# Patient Record
Sex: MALE | HISPANIC OR LATINO | Employment: OTHER | URBAN - METROPOLITAN AREA
[De-identification: names, ages, dates, MRNs, and addresses within clinical notes are randomized per-mention and may not be internally consistent; named-entity substitution may affect disease eponyms.]

---

## 2020-02-11 ENCOUNTER — OFFICE VISIT - HEALTHEAST (OUTPATIENT)
Dept: CARDIOLOGY | Facility: CLINIC | Age: 60
End: 2020-02-11

## 2020-02-11 DIAGNOSIS — I10 BENIGN ESSENTIAL HYPERTENSION: ICD-10-CM

## 2020-02-11 LAB
ALBUMIN SERPL-MCNC: 4.2 G/DL (ref 3.5–5)
ALBUMIN UR-MCNC: ABNORMAL MG/DL
ALP SERPL-CCNC: 61 U/L (ref 45–120)
ALT SERPL W P-5'-P-CCNC: 41 U/L (ref 0–45)
ANION GAP SERPL CALCULATED.3IONS-SCNC: 8 MMOL/L (ref 5–18)
APPEARANCE UR: CLEAR
AST SERPL W P-5'-P-CCNC: 24 U/L (ref 0–40)
BACTERIA #/AREA URNS HPF: ABNORMAL HPF
BILIRUB SERPL-MCNC: 0.4 MG/DL (ref 0–1)
BILIRUB UR QL STRIP: NEGATIVE
BUN SERPL-MCNC: 24 MG/DL (ref 8–22)
CALCIUM SERPL-MCNC: 9.5 MG/DL (ref 8.5–10.5)
CHLORIDE BLD-SCNC: 106 MMOL/L (ref 98–107)
CO2 SERPL-SCNC: 26 MMOL/L (ref 22–31)
COLOR UR AUTO: YELLOW
CREAT SERPL-MCNC: 0.9 MG/DL (ref 0.7–1.3)
ERYTHROCYTE [DISTWIDTH] IN BLOOD BY AUTOMATED COUNT: 12.7 % (ref 11–14.5)
GFR SERPL CREATININE-BSD FRML MDRD: >60 ML/MIN/1.73M2
GLUCOSE BLD-MCNC: 78 MG/DL (ref 70–125)
GLUCOSE UR STRIP-MCNC: ABNORMAL MG/DL
HCT VFR BLD AUTO: 44.3 % (ref 40–54)
HGB BLD-MCNC: 14.9 G/DL (ref 14–18)
HGB UR QL STRIP: NEGATIVE
KETONES UR STRIP-MCNC: NEGATIVE MG/DL
LEUKOCYTE ESTERASE UR QL STRIP: NEGATIVE
MCH RBC QN AUTO: 30.6 PG (ref 27–34)
MCHC RBC AUTO-ENTMCNC: 33.6 G/DL (ref 32–36)
MCV RBC AUTO: 91 FL (ref 80–100)
MUCOUS THREADS #/AREA URNS LPF: ABNORMAL LPF
NITRATE UR QL: NEGATIVE
PH UR STRIP: 5.5 [PH] (ref 4.5–8)
PLATELET # BLD AUTO: 181 THOU/UL (ref 140–440)
PMV BLD AUTO: 12.4 FL (ref 8.5–12.5)
POTASSIUM BLD-SCNC: 3.9 MMOL/L (ref 3.5–5)
PROT SERPL-MCNC: 7.2 G/DL (ref 6–8)
RBC # BLD AUTO: 4.87 MILL/UL (ref 4.4–6.2)
RBC #/AREA URNS AUTO: ABNORMAL HPF
SODIUM SERPL-SCNC: 140 MMOL/L (ref 136–145)
SP GR UR STRIP: 1.03 (ref 1–1.03)
SQUAMOUS #/AREA URNS AUTO: ABNORMAL LPF
UROBILINOGEN UR STRIP-ACNC: ABNORMAL
WBC #/AREA URNS AUTO: ABNORMAL HPF
WBC: 7.6 THOU/UL (ref 4–11)

## 2020-02-12 ENCOUNTER — AMBULATORY - HEALTHEAST (OUTPATIENT)
Dept: CARDIOLOGY | Facility: CLINIC | Age: 60
End: 2020-02-12

## 2020-02-12 DIAGNOSIS — I10 BENIGN ESSENTIAL HYPERTENSION: ICD-10-CM

## 2020-02-12 LAB
CHOLEST SERPL-MCNC: 123 MG/DL
FASTING STATUS PATIENT QL REPORTED: YES
HDLC SERPL-MCNC: 30 MG/DL
LDLC SERPL CALC-MCNC: 73 MG/DL
TRIGL SERPL-MCNC: 99 MG/DL

## 2020-02-13 LAB
SHBG SERPL-SCNC: 19 NMOL/L (ref 11–80)
TESTOST FREE SERPL-MCNC: 6.19 NG/DL (ref 4.7–24.4)
TESTOST SERPL-MCNC: 242 NG/DL (ref 240–950)

## 2020-07-30 ENCOUNTER — VIRTUAL VISIT (OUTPATIENT)
Dept: FAMILY MEDICINE | Facility: OTHER | Age: 60
End: 2020-07-30

## 2020-07-31 ENCOUNTER — AMBULATORY - HEALTHEAST (OUTPATIENT)
Dept: FAMILY MEDICINE | Facility: CLINIC | Age: 60
End: 2020-07-31

## 2020-07-31 DIAGNOSIS — Z20.822 SUSPECTED COVID-19 VIRUS INFECTION: ICD-10-CM

## 2020-07-31 NOTE — PROGRESS NOTES
"Date: 2020 07:08:38  Clinician: Melissa Roe  Clinician NPI: [id number]  Patient: Pastor Churchill  Patient : 1960  Patient Address: [address] [place], [place] [address]  Patient Phone: [phone]  Visit Protocol: URI  Patient Summary:   is a 59 year old ( : 1960 ) male who initiated a Visit for COVID-19 (Coronavirus) evaluation and screening. When asked the question \"Please sign me up to receive news, health information and promotions. \",  responded \"No\".     states his symptoms started 1-2 days ago.   His symptoms consist of diarrhea, a cough, nasal congestion, rhinitis, malaise, and a headache.   Symptom details     Nasal secretions: The color of his mucus is clear.    Cough:  coughs a few times an hour and his cough is not more bothersome at night. Phlegm does not come into his throat when he coughs. He does not believe his cough is caused by post-nasal drip.     Headache: He states the headache is mild (1-3 on a 10 point pain scale).       denies having wheezing, nausea, teeth pain, ageusia, myalgias, sore throat, anosmia, facial pain or pressure, fever, vomiting, ear pain, and chills. He also denies having recent facial or sinus surgery in the past 60 days and taking antibiotic medication in the past month. He is not experiencing dyspnea.   Precipitating events  He has not recently been exposed to someone with influenza.  has been in close contact with the following high risk individuals: adults 65 or older and people with asthma, heart disease or diabetes.   Pertinent COVID-19 (Coronavirus) information  In the past 14 days,  has not worked in a congregate living setting.   He does not work or volunteer as healthcare worker or a  and does not work or volunteer in a healthcare facility.    also has not lived in a congregate living setting in the past 14 days. He does not live with a healthcare worker.    has " not had a close contact with a laboratory-confirmed COVID-19 patient within 14 days of symptom onset.   Pertinent medical history   does not need a return to work/school note.   Weight: 220 lbs    does not smoke or use smokeless tobacco.   Weight: 220 lbs    MEDICATIONS: atorvastatin oral, ALLERGIES: enalapril  Clinician Response:  Dear ,   Your symptoms show that you may have coronavirus (COVID-19). This illness can cause fever, cough and trouble breathing. Many people get a mild case and get better on their own. Some people can get very sick.  What should I do?  We would like to test you for this virus.   1. Please call [phone] to schedule your visit. Explain that you were referred by [facility] to have a COVID-19 test. Be ready to share [facility].  The following will serve as your written order for this COVID Test, ordered by me, for the indication of suspected COVID [Z20.828]: The test will be ordered in [facility], our electronic health record, after you are scheduled. It will show as ordered and authorized by Osmin Miranda MD.  Order: COVID-19 (Coronavirus) PCR for SYMPTOMATIC testing from [facility].      2. When it's time for your COVID test:  Stay at least 6 feet away from others. (If someone will drive you to your test, stay in the backseat, as far away from the  as you can.)   Cover your mouth and nose with a mask, tissue or washcloth.  Go straight to the testing site. Don't make any stops on the way there or back.      3.Starting now: Stay home and away from others (self-isolate) until:   You've had no fever---and no medicine that reduces fever---for 3 full days (72 hours). And...   Your other symptoms have gotten better. For example, your cough or breathing has improved. And...   At least 10 days have passed since your symptoms started.       During this time, don't leave the house except for testing or medical care.   Stay in your own room, even for meals. Use your own  "bathroom if you can.   Stay away from others in your home. No hugging, kissing or shaking hands. No visitors.  Don't go to work, school or anywhere else.    Clean \"high touch\" surfaces often (doorknobs, counters, handles, etc.). Use a household cleaning spray or wipes. You'll find a full list of  on the EPA website: www.epa.gov/pesticide-registration/list-n-disinfectants-use-against-sars-cov-2.   Cover your mouth and nose with a mask, tissue or washcloth to avoid spreading germs.  Wash your hands and face often. Use soap and water.  Caregivers in these groups are at risk for severe illness due to COVID-19:  o People 65 years and older  o People who live in a nursing home or long-term care facility  o People with chronic disease (lung, heart, cancer, diabetes, kidney, liver, immunologic)  o People who have a weakened immune system, including those who:   Are in cancer treatment  Take medicine that weakens the immune system, such as corticosteroids  Had a bone marrow or organ transplant  Have an immune deficiency  Have poorly controlled HIV or AIDS  Are obese (body mass index of 40 or higher)  Smoke regularly   o Caregivers should wear gloves while washing dishes, handling laundry and cleaning bedrooms and bathrooms.  o Use caution when washing and drying laundry: Don't shake dirty laundry, and use the warmest water setting that you can.  o For more tips, go to www.cdc.gov/coronavirus/2019-ncov/downloads/10Things.pdf.    4.Sign up for [employer]. We know it's scary to hear that you might have COVID-19. We want to track your symptoms to make sure you're okay over the next 2 weeks. Please look for an email from [employer]---this is a free, online program that we'll use to keep in touch. To sign up, follow the link in the email. Learn more at [email or web address]  How can I take care of myself?   Get lots of rest. Drink extra fluids (unless a doctor has told you not to).   Take Tylenol " (acetaminophen) for fever or pain. If you have liver or kidney problems, ask your family doctor if it's okay to take Tylenol.   Adults can take either:    650 mg (two 325 mg pills) every 4 to 6 hours, or...   1,000 mg (two 500 mg pills) every 8 hours as needed.    Note: Don't take more than 3,000 mg in one day. Acetaminophen is found in many medicines (both prescribed and over-the-counter medicines). Read all labels to be sure you don't take too much.   For children, check the Tylenol bottle for the right dose. The dose is based on the child's age or weight.    If you have other health problems (like cancer, heart failure, an organ transplant or severe kidney disease): Call your specialty clinic if you don't feel better in the next 2 days.       Know when to call 911. Emergency warning signs include:    Trouble breathing or shortness of breath Pain or pressure in the chest that doesn't go away Feeling confused like you haven't felt before, or not being able to wake up Bluish-colored lips or face.  Where can I get more information?   [facility] -- About COVID-19: [email or web address]   CDC -- What to Do If You're Sick: www.cdc.gov/coronavirus/2019-ncov/about/steps-when-sick.html   CDC -- Ending Home Isolation: www.cdc.gov/coronavirus/2019-ncov/hcp/disposition-in-home-patients.html   CDC -- Caring for Someone: www.cdc.gov/coronavirus/2019-ncov/if-you-are-sick/care-for-someone.html   [facility] -- Interim Guidance for Hospital Discharge to Home: www.health.[facility].[place]/diseases/coronavirus/hcp/hospdischarge.pdf   [facility] clinical trials (COVID-19 research studies): clinicalaffairs.[facility]/umn-clinical-trials    Below are the COVID-19 hotlines at the [place] Department of Health ([facility]). Interpreters are available.    For health questions: Call 737-058-2562 or 1-858.447.3607 (7 a.m. to 7 p.m.) For questions about schools and childcare: Call 426-187-3692 or 1-706.485.7897 (7 a.m. to 7 p.m.)     Diagnosis: Cough  Diagnosis ICD: R05

## 2020-08-02 ENCOUNTER — COMMUNICATION - HEALTHEAST (OUTPATIENT)
Dept: SCHEDULING | Facility: CLINIC | Age: 60
End: 2020-08-02

## 2020-08-03 ENCOUNTER — COMMUNICATION - HEALTHEAST (OUTPATIENT)
Dept: SCHEDULING | Facility: CLINIC | Age: 60
End: 2020-08-03

## 2020-08-03 ENCOUNTER — NURSE TRIAGE (OUTPATIENT)
Dept: NURSING | Facility: CLINIC | Age: 60
End: 2020-08-03

## 2020-08-18 ENCOUNTER — COMMUNICATION - HEALTHEAST (OUTPATIENT)
Dept: CARDIOLOGY | Facility: CLINIC | Age: 60
End: 2020-08-18

## 2020-08-18 DIAGNOSIS — I10 BENIGN ESSENTIAL HYPERTENSION: ICD-10-CM

## 2020-08-25 ENCOUNTER — AMBULATORY - HEALTHEAST (OUTPATIENT)
Dept: LAB | Facility: CLINIC | Age: 60
End: 2020-08-25

## 2020-08-25 DIAGNOSIS — I10 BENIGN ESSENTIAL HYPERTENSION: ICD-10-CM

## 2020-08-25 LAB
ANION GAP SERPL CALCULATED.3IONS-SCNC: 9 MMOL/L (ref 5–18)
BUN SERPL-MCNC: 21 MG/DL (ref 8–22)
CALCIUM SERPL-MCNC: 9.3 MG/DL (ref 8.5–10.5)
CHLORIDE BLD-SCNC: 105 MMOL/L (ref 98–107)
CO2 SERPL-SCNC: 21 MMOL/L (ref 22–31)
CREAT SERPL-MCNC: 0.93 MG/DL (ref 0.7–1.3)
GFR SERPL CREATININE-BSD FRML MDRD: >60 ML/MIN/1.73M2
GLUCOSE BLD-MCNC: 90 MG/DL (ref 70–125)
POTASSIUM BLD-SCNC: 4.3 MMOL/L (ref 3.5–5)
SODIUM SERPL-SCNC: 135 MMOL/L (ref 136–145)

## 2020-10-16 ENCOUNTER — COMMUNICATION - HEALTHEAST (OUTPATIENT)
Dept: CARDIOLOGY | Facility: CLINIC | Age: 60
End: 2020-10-16

## 2020-10-16 ENCOUNTER — COMMUNICATION - HEALTHEAST (OUTPATIENT)
Dept: SCHEDULING | Facility: CLINIC | Age: 60
End: 2020-10-16

## 2020-10-16 DIAGNOSIS — I10 BENIGN ESSENTIAL HYPERTENSION: ICD-10-CM

## 2020-11-18 ENCOUNTER — AMBULATORY - HEALTHEAST (OUTPATIENT)
Dept: LAB | Facility: CLINIC | Age: 60
End: 2020-11-18

## 2020-11-18 ENCOUNTER — OFFICE VISIT - HEALTHEAST (OUTPATIENT)
Dept: FAMILY MEDICINE | Facility: CLINIC | Age: 60
End: 2020-11-18

## 2020-11-18 DIAGNOSIS — Z20.822 CLOSE EXPOSURE TO 2019 NOVEL CORONAVIRUS: ICD-10-CM

## 2020-11-20 ENCOUNTER — COMMUNICATION - HEALTHEAST (OUTPATIENT)
Dept: SCHEDULING | Facility: CLINIC | Age: 60
End: 2020-11-20

## 2020-11-22 ENCOUNTER — HEALTH MAINTENANCE LETTER (OUTPATIENT)
Age: 60
End: 2020-11-22

## 2020-11-30 ENCOUNTER — COMMUNICATION - HEALTHEAST (OUTPATIENT)
Dept: CARDIOLOGY | Facility: CLINIC | Age: 60
End: 2020-11-30

## 2020-11-30 DIAGNOSIS — I10 BENIGN ESSENTIAL HYPERTENSION: ICD-10-CM

## 2020-12-23 ENCOUNTER — COMMUNICATION - HEALTHEAST (OUTPATIENT)
Dept: CARDIOLOGY | Facility: CLINIC | Age: 60
End: 2020-12-23

## 2020-12-23 DIAGNOSIS — E78.5 HYPERLIPIDEMIA: ICD-10-CM

## 2020-12-23 DIAGNOSIS — I10 BENIGN ESSENTIAL HYPERTENSION: ICD-10-CM

## 2021-02-15 ENCOUNTER — COMMUNICATION - HEALTHEAST (OUTPATIENT)
Dept: CARDIOLOGY | Facility: CLINIC | Age: 61
End: 2021-02-15

## 2021-02-18 ENCOUNTER — OFFICE VISIT - HEALTHEAST (OUTPATIENT)
Dept: CARDIOLOGY | Facility: CLINIC | Age: 61
End: 2021-02-18

## 2021-02-18 DIAGNOSIS — I10 BENIGN ESSENTIAL HYPERTENSION: ICD-10-CM

## 2021-02-18 RX ORDER — AMLODIPINE BESYLATE 5 MG/1
5 TABLET ORAL DAILY
Qty: 90 TABLET | Refills: 3 | Status: SHIPPED | OUTPATIENT
Start: 2021-02-18 | End: 2021-08-04

## 2021-02-18 RX ORDER — FINASTERIDE 5 MG/1
5 TABLET, FILM COATED ORAL DAILY
Qty: 90 TABLET | Refills: 3 | Status: SHIPPED | OUTPATIENT
Start: 2021-02-18 | End: 2022-03-28

## 2021-06-01 ENCOUNTER — COMMUNICATION - HEALTHEAST (OUTPATIENT)
Dept: CARDIOLOGY | Facility: CLINIC | Age: 61
End: 2021-06-01

## 2021-06-01 DIAGNOSIS — E78.5 HYPERLIPIDEMIA: ICD-10-CM

## 2021-06-01 DIAGNOSIS — I10 BENIGN ESSENTIAL HYPERTENSION: ICD-10-CM

## 2021-06-01 RX ORDER — ATORVASTATIN CALCIUM 40 MG/1
40 TABLET, FILM COATED ORAL AT BEDTIME
Qty: 90 TABLET | Refills: 1 | Status: SHIPPED | OUTPATIENT
Start: 2021-06-01 | End: 2022-04-22

## 2021-06-01 RX ORDER — LISINOPRIL 20 MG/1
20 TABLET ORAL DAILY
Qty: 90 TABLET | Refills: 1 | Status: SHIPPED | OUTPATIENT
Start: 2021-06-01 | End: 2022-09-29

## 2021-06-04 VITALS
SYSTOLIC BLOOD PRESSURE: 128 MMHG | RESPIRATION RATE: 16 BRPM | DIASTOLIC BLOOD PRESSURE: 74 MMHG | HEART RATE: 72 BPM | WEIGHT: 247 LBS

## 2021-06-06 NOTE — PATIENT INSTRUCTIONS - HE
"Pastor Churchill,    It was a pleasure to see you today at the [facility].     My recommendations after this visit include:    1.  Please stop your enalapril and start lisinopril 5 mg each day.    2.  In a couple of weeks, please call us with about 10 blood pressure readings.  You can call Macey Davison RN at [phone]    3.  Would recommend 30 minutes of aerobic exercise, at least 5 times a week.    4.  Would recommend reducing carbohydrates in your diet by about 50% and substituting protein.  This along with aerobic exercise would undoubtedly result in some weight loss which would be beneficial as well.    5.  I would recommend you repeat blood work to check your kidney function and electrolytes in about 3 weeks because of the increased dose of your blood pressure medication.    6.  I would recommend you read the book\" the science of a long life\" by Dr. Lupe Martinez    7.  I would recommend a follow-up visit in the fall.        Cain Worthy          "

## 2021-06-10 NOTE — TELEPHONE ENCOUNTER
Coronavirus (COVID-19) Notification    Lab Result   Lab test 2019-nCoV rRt-PCR OR SARS-COV-2 PCR    Nasopharyngeal AND/OR Oropharyngeal swab is NEGATIVE for 2019-nCoV RNA [OR] SARS-COV-2 RNA (COVID-19) RNA    Your result was negative. This means that we didn't find the virus that causes COVID-19 in your sample. A test may show negative when you do actually have the virus. This can happen when the virus is in the early stages of infection, before you feel illness symptoms.    If you have symptoms   Stay home and away from others (self-isolate) until you meet ALL of the guidelines below:    You've had no fever--and no medicine that reduces fever--for 3 full days (72 hours). And      Your other symptoms have gotten better. For example, your cough or breathing has improved. And     At least 10 days have passed since your symptoms started.    During this time:    Stay home. Don't go to work, school or anywhere else.     Stay in your own room, including for meals. Use your own bathroom if you can.    Stay away from others in your home. No hugging, kissing or shaking hands. No visitors.    Clean  high touch  surfaces often (doorknobs, counters, handles, etc.). Use a household cleaning spray or wipes. You can find a full list on the EPA website at www.epa.gov/pesticide-registration/list-n-disinfectants-use-against-sars-cov-2.    Cover your mouth and nose with a mask, tissue or washcloth to avoid spreading germs.    Wash your hands and face often with soap and water.    Going back to work  Check with your employer for any guidelines to follow for going back to work.  You are sent a letter for your Employer which will serve as formal document notice that you, the employee, tested negative for COVID-19, as of the testing date shown above.    If your symptoms worsen or other concerning symptoms, contact PCP, oncare or consider returning to Emergency Dept.    Where can I get more information?    [facility]:  [email or web address]    Coronavirus Basics: www.[facility]./diseases/coronavirus/basics.html    [facility] Hotline ([phone])    Sherin Yusuf RN  [facility] Triage Nurse Advisor

## 2021-06-10 NOTE — TELEPHONE ENCOUNTER
Received call back from patient. Reviewed response and recommendations per Dr. Worthy. Patient verbalized understanding and agreement with plan.  Rx sent to patient pharmacy.  Lab order placed.   Patient will call with update on BP after 2 weeks of increased dose. -karely

## 2021-06-10 NOTE — TELEPHONE ENCOUNTER
----- Message -----  From: Cain Worthy MD  Sent: 2020   3:55 PM CDT  To: Macey Davison RN  Subject: FW: Updates about my health                      Macey,    This message is from the patient's son who I saw as well.  He used his father's MyChart for this message.  He was on 5 mg of lisinopril when I last saw him.  I would recommend he go to 10 mg of lisinopril and check a basic metabolic panel in 1 to 2 weeks and call us with some blood pressures.    Thanks,    Cain    ----- Message -----     From: Pastor Zhao Qulies     Sent: 2020  3:33 PM CDT       To: Doctor LENKA Worthy  Subject: Updates about my health    Alex,    This is Rupali Churchill Jr ( 1960). BP readings are still running rather high. Today it was 162/116. Your thoughts would be valued...    My phone number is [phone].     Thank you ,    Rupali

## 2021-06-13 NOTE — PATIENT INSTRUCTIONS - HE
Dear Pastor Churchill,    Based on your exposure to COVID-19 (coronavirus), we would like to test you for this virus. I have placed an order for this test.    To schedule testing, go to your [facility] home page and scroll down to the section that says  You have an appointment that needs to be scheduled  and click the large green button that says  Schedule Now  and follow the steps to find the next available opening.     If you are unable to complete these [facility] scheduling steps, please call [phone] to schedule your testing.     If you know you have had close contact with someone who tested positive, you should be quarantined for 14 days after this exposure. You should stay in quarantine for the14 days even if the covid test is negative.     Quarantine means:  Stay home and away from others. Don't go to school or anywhere else. Generally quarantine means staying home from work but there are some exceptions to this. Please contact your workplace.  No hugging, kissing or shaking hands.  Don't let anyone visit.  Cover your mouth and nose with a mask, tissue or washcloth to avoid spreading germs.  Wash your hands and face often. Use soap and water.    What are the symptoms of COVID-19?  The most common symptoms are cough, fever and trouble breathing. Less common symptoms include headache, body aches, fatigue (feeling very tired), chills, sore throat, stuffy or runny nose, diarrhea (loose poop), loss of taste or smell, belly pain, and nausea or vomiting (feeling sick to your stomach or throwing up).  After 14 days, if you have still don't have symptoms, you likely don't have this virus.  If you develop symptoms, follow these guidelines.  If you're normally healthy: Please start another eVisit.  If you have a serious health problem (like cancer, heart failure, an organ transplant or kidney disease): Call your specialty clinic. Let them know that you might have COVID-19.    When it's time for your COVID test:  Stay at  least 6 feet away from others. (If someone will drive you to your test, stay in the backseat, as far away from the  as you can.)  Cover your mouth and nose with a mask, tissue or washcloth.  Go straight to the testing site. Don't make any stops on the way there or back.    Please note  Patients in these groups are at risk for severe illness due to COVID-19:    People 65 years and older    People who live in a nursing home or long-term care facility    People with chronic disease (lung, heart, cancer, diabetes, kidney, liver, immunologic)    People who have a weakened immune system, including those who:  o Are in cancer treatment  o Take medicine that weakens the immune system, such as corticosteroids  o Had a bone marrow or organ transplant  o Have an immune deficiency  o Have poorly controlled HIV or AIDS  o Are obese (body mass index of 40 or higher)  o Smoke regularly    Where can I get more information?  [facility] - About COVID-19: [email or web address]  CDC - What to Do If You're Sick: www.cdc.gov/coronavirus/2019-ncov/about/steps-when-sick.html  CDC - Ending Home Isolation: www.cdc.gov/coronavirus/2019-ncov/hcp/disposition-in-home-patients.html  CDC - Caring for Someone: www.cdc.gov/coronavirus/2019-ncov/if-you-are-sick/care-for-someone.html  [facility] - Interim Guidance for Hospital Discharge to Home: www.health.[facility].mn.us/diseases/coronavirus/hcp/hospdischarge.pdf  [facility] clinical trials (COVID-19 research studies): clinicalaffairs.[facility]-clinical-trials  Below are the COVID-19 hotlines at the [place] Department of Health ([facility]). Interpreters are available.  For health questions: Call 909-972-3922 or 1-186.688.3199 (7 a.m. to 7 p.m.)  For questions about schools and childcare: Call 758-419-1406 or 1-715.378.6193 (7 a.m. to 7 p.m.)

## 2021-06-16 PROBLEM — I10 BENIGN ESSENTIAL HYPERTENSION: Status: ACTIVE | Noted: 2020-02-11

## 2021-06-30 NOTE — PROGRESS NOTES
"Progress Notes by Cain Worthy MD at 2/18/2021  3:20 PM     Author: Cain Worthy MD Service: -- Author Type: Physician    Filed: 2/18/2021  4:56 PM Encounter Date: 2/18/2021 Status: Signed    : Cain Worthy MD (Physician)           The patient has been notified of following:     \"This telephone visit will be conducted via a call between you and your physician/provider. We have found that certain health care needs can be provided without the need for a physical exam.  This service lets us provide the care you need with a phone conversation.  If a prescription is necessary we can send it directly to your pharmacy.  If lab work is needed we can place an order for that and you can then stop by our lab to have the test done at a later time. If during the course of the call the physician/provider feels a telephone visit is not appropriate, you will not be charged for this service.\" Verbal consent has been obtained for this service by care team member:         HEART CARE PHONE ENCOUNTER        The patient has chosen to have the visit conducted as a telephone visit, to reduce risk of exposure given the current status of Coronavirus in our community. This telephone visit is being conducted via a call between the patient and physician/provider. Health care needs are being provided without a physical exam.     Assessment/Recommendations   Assessment: Patient with known hypertension which has been elevated more of late on his home readings.  He feels like his cough is accurate as it reads quite similarly to his father's cough.  His blood pressures have been sometimes as high as 200 systolic.    We discussed the possibility of adding a beta-blocker or calcium channel blocker.  We discussed the pros and cons and side effects of each class and he voted for the calcium channel blocker.  We will start him on 5 mg of amlodipine and I warned him about the possibility of peripheral edema.  He will call us in the " next 1 to 2 weeks with some more blood pressures to see if this is of benefit to him.    He will be in [place] and may and we will set up a follow-up appointment with him at the end of May but we may need to send him in for blood work or correlating his blood pressure cuff at a medical clinic prior to that time.            Follow Up Plan: Follow up in   I have reviewed the note as documented.  This accurately captures the substance of my conversation with the patient.    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  Patient with known hypertension and is been placed on an ACE inhibitor at a moderate dose.  His blood pressure in the last 3 to 4 months has been increasing.  He does not feel like he is done anything differently has not gained weight.  Neither here nor his spouse feel like he has sleep apnea.  He has not been exercising on a regular basis but is active around the house.  He denies orthopnea, paroxysmal nocturnal dyspnea, peripheral edema, syncope, near syncope or chest discomfort.  Blood pressures at home have been as high as 200 systolic.      I have reviewed and updated the patient's Past Medical History, Social History, Family History and Medication List.     Physical Examination not performed given phone encounter Review of Systems                                                Medical History  Surgical History Family History Social History   Past Medical History:   Diagnosis Date   ? Hypertension     Past Surgical History:   Procedure Laterality Date   ? ORCHIECTOMY      Family History   Problem Relation Age of Onset   ? CABG Father     Social History     Socioeconomic History   ? Marital status:      Spouse name: Not on file   ? Number of children: Not on file   ? Years of education: Not on file    ? Highest education level: Not on file   Occupational History   ? Not on file   Social Needs   ? Financial resource strain: Not on file   ? Food insecurity     Worry: Not on file     Inability: Not on file   ? Transportation needs     Medical: Not on file     Non-medical: Not on file   Tobacco Use   ? Smoking status: Passive Smoke Exposure - Never Smoker   ? Smokeless tobacco: Never Used   Substance and Sexual Activity   ? Alcohol use: Not on file   ? Drug use: Not on file   ? Sexual activity: Not on file   Lifestyle   ? Physical activity     Days per week: Not on file     Minutes per session: Not on file   ? Stress: Not on file   Relationships   ? Social connections     Talks on phone: Not on file     Gets together: Not on file     Attends Yazdanism service: Not on file     Active member of club or organization: Not on file     Attends meetings of clubs or organizations: Not on file     Relationship status: Not on file   ? Intimate partner violence     Fear of current or ex partner: Not on file     Emotionally abused: Not on file     Physically abused: Not on file     Forced sexual activity: Not on file   Other Topics Concern   ? Not on file   Social History Narrative   ? Not on file          Medications  Allergies   Current Outpatient Medications   Medication Sig Dispense Refill   ? atorvastatin (LIPITOR) 40 MG tablet Take 1 tablet (40 mg total) by mouth at bedtime. 90 tablet 1   ? finasteride (PROSCAR) 5 mg tablet Take 5 mg by mouth daily.     ? lisinopriL (PRINIVIL,ZESTRIL) 20 MG tablet Take 1 tablet (20 mg total) by mouth daily. 90 tablet 1     No current facility-administered medications for this visit.     No Known Allergies      Lab Results    Chemistry/lipid CBC Cardiac Enzymes/BNP/TSH/INR   Lab Results   Component Value Date    CHOL 123 02/12/2020    HDL 30 (L) 02/12/2020    LDLCALC 73 02/12/2020    TRIG 99 02/12/2020    CREATININE 0.93 08/25/2020    BUN 21 08/25/2020    K 4.3 08/25/2020     (L)  08/25/2020     08/25/2020    CO2 21 (L) 08/25/2020    Lab Results   Component Value Date    WBC 7.6 02/11/2020    HGB 14.9 02/11/2020    HCT 44.3 02/11/2020    MCV 91 02/11/2020     02/11/2020    No results found for: CKTOTAL, CKMB, CKMBINDEX, TROPONINI, BNP, TSH, INR     Cain Worthy

## 2021-07-29 ENCOUNTER — E-VISIT (OUTPATIENT)
Dept: URGENT CARE | Facility: CLINIC | Age: 61
End: 2021-07-29
Payer: COMMERCIAL

## 2021-07-29 DIAGNOSIS — Z20.822 SUSPECTED COVID-19 VIRUS INFECTION: Primary | ICD-10-CM

## 2021-07-29 PROCEDURE — 99421 OL DIG E/M SVC 5-10 MIN: CPT | Performed by: EMERGENCY MEDICINE

## 2021-07-29 NOTE — PATIENT INSTRUCTIONS
Dear Pastor Churchill,    Your symptoms show that you may have coronavirus (COVID-19). This illness can cause fever, cough and trouble breathing. Many people get a mild case and get better on their own. Some people can get very sick.    Will I be tested for COVID-19?  We would like to test you for Covid-19 virus. I have placed orders for this test.     To schedule: go to your [employer] home page and scroll down to the section that says  You have an appointment that needs to be scheduled  and click the large green button that says  Schedule Now  and follow the steps to find the next available openings.    If you are unable to complete these [employer] scheduling steps, please call [phone] to schedule your testing.     Return to work/school/ guidance:  Please let your workplace manager and staffing office know when your quarantine ends     We can t give you an exact date as it depends on the above. You can calculate this on your own or work with your manager/staffing office to calculate this. (For example if you were exposed on 10/4, you would have to quarantine for 14 full days. That would be through 10/18. You could return on 10/19.)      If you receive a positive COVID-19 test result, follow the guidance of the those who are giving you the results. Usually the return to work is 10 (or in some cases 20 days from symptom onset.) If you work at [employer], you must also be cleared by Employee Occupational Health and Safety to return to work.        If you receive a negative COVID-19 test result and did not have a high risk exposure to someone with a known positive COVID-19 test, you can return to work once you're free of fever for 24 hours without fever-reducing medication and your symptoms are improving or resolved.      If you receive a negative COVID-19 test and If you had a high risk exposure to someone who has tested positive for COVID-19 then you can return to work 14 days after your last contact  with the positive individual    Note: If you have ongoing exposure to the covid positive person, this quarantine period may be more than 14 days. (For example, if you are continued to be exposed to your child who tested positive and cannot isolate from them, then the quarantine of 7-14 days can't start until your child is no longer contagious. This is typically 10 days from onset of the child's symptoms. So the total duration may be 17-24 days in this case.)    Sign up for [employer].   We know it's scary to hear that you might have COVID-19. We want to track your symptoms to make sure you're okay over the next 2 weeks. Please look for an email from [employer]--this is a free, online program that we'll use to keep in touch. To sign up, follow the link in the email you will receive. Learn more at [email or web address]    How can I take care of myself?    Get lots of rest. Drink extra fluids (unless a doctor has told you not to)    Take Tylenol (acetaminophen) or ibuprofen for fever or pain. If you have liver or kidney problems, ask your family doctor if it's okay to take Tylenol o ibuprofen    If you have other health problems (like cancer, heart failure, an organ transplant or severe kidney disease): Call your specialty clinic if you don't feel better in the next 2 days.    Know when to call 911. Emergency warning signs include:  o Trouble breathing or shortness of breath  o Pain or pressure in the chest that doesn't go away  o Feeling confused like you haven't felt before, or not being able to wake up  o Bluish-colored lips or face    Where can I get more information?  [facility] - About COVID-19:   [email or web address]    CDC - What to Do If You're Sick:   www.cdc.gov/coronavirus/2019-ncov/about/steps-when-sick.html

## 2021-07-30 ENCOUNTER — E-VISIT (OUTPATIENT)
Dept: URGENT CARE | Facility: URGENT CARE | Age: 61
End: 2021-07-30

## 2021-07-30 DIAGNOSIS — Z20.822 SUSPECTED COVID-19 VIRUS INFECTION: Primary | ICD-10-CM

## 2021-07-30 PROCEDURE — 99207 PR NON-BILLABLE SERV PER CHARTING: CPT | Performed by: PHYSICIAN ASSISTANT

## 2021-07-30 NOTE — PATIENT INSTRUCTIONS
Dear Pastor Churchill,    We are sorry you are not feeling well. Based on the responses you provided, it is recommended that you be seen in-person in urgent care so we can better evaluate your symptoms. Please click here to find the nearest urgent care location to you.   You will not be charged for this Visit. Thank you for trusting us with your care.    Syd Andrew PA-C

## 2021-08-04 ENCOUNTER — OFFICE VISIT (OUTPATIENT)
Dept: CARDIOLOGY | Facility: CLINIC | Age: 61
End: 2021-08-04
Payer: COMMERCIAL

## 2021-08-04 VITALS
RESPIRATION RATE: 20 BRPM | HEART RATE: 91 BPM | WEIGHT: 229.8 LBS | DIASTOLIC BLOOD PRESSURE: 84 MMHG | HEIGHT: 73 IN | BODY MASS INDEX: 30.46 KG/M2 | SYSTOLIC BLOOD PRESSURE: 130 MMHG

## 2021-08-04 DIAGNOSIS — I10 BENIGN ESSENTIAL HYPERTENSION: Primary | ICD-10-CM

## 2021-08-04 DIAGNOSIS — I10 BENIGN ESSENTIAL HYPERTENSION: ICD-10-CM

## 2021-08-04 LAB
ALBUMIN SERPL-MCNC: 4 G/DL (ref 3.5–5)
ALP SERPL-CCNC: 74 U/L (ref 45–120)
ALT SERPL W P-5'-P-CCNC: 22 U/L (ref 0–45)
ANION GAP SERPL CALCULATED.3IONS-SCNC: 9 MMOL/L (ref 5–18)
AST SERPL W P-5'-P-CCNC: 16 U/L (ref 0–40)
BILIRUB SERPL-MCNC: 0.4 MG/DL (ref 0–1)
BUN SERPL-MCNC: 20 MG/DL (ref 8–22)
CALCIUM SERPL-MCNC: 9.5 MG/DL (ref 8.5–10.5)
CHLORIDE BLD-SCNC: 105 MMOL/L (ref 98–107)
CO2 SERPL-SCNC: 24 MMOL/L (ref 22–31)
CREAT SERPL-MCNC: 0.82 MG/DL (ref 0.7–1.3)
ERYTHROCYTE [DISTWIDTH] IN BLOOD BY AUTOMATED COUNT: 13 % (ref 10–15)
GFR SERPL CREATININE-BSD FRML MDRD: >90 ML/MIN/1.73M2
GLUCOSE BLD-MCNC: 104 MG/DL (ref 70–125)
HCT VFR BLD AUTO: 42.3 % (ref 40–53)
HGB BLD-MCNC: 14 G/DL (ref 13.3–17.7)
MCH RBC QN AUTO: 29.6 PG (ref 26.5–33)
MCHC RBC AUTO-ENTMCNC: 33.1 G/DL (ref 31.5–36.5)
MCV RBC AUTO: 89 FL (ref 78–100)
PLATELET # BLD AUTO: 322 10E3/UL (ref 150–450)
POTASSIUM BLD-SCNC: 4.3 MMOL/L (ref 3.5–5)
PROT SERPL-MCNC: 7.9 G/DL (ref 6–8)
RBC # BLD AUTO: 4.73 10E6/UL (ref 4.4–5.9)
SODIUM SERPL-SCNC: 138 MMOL/L (ref 136–145)
WBC # BLD AUTO: 10.9 10E3/UL (ref 4–11)

## 2021-08-04 PROCEDURE — 85027 COMPLETE CBC AUTOMATED: CPT | Performed by: INTERNAL MEDICINE

## 2021-08-04 PROCEDURE — 99214 OFFICE O/P EST MOD 30 MIN: CPT | Performed by: INTERNAL MEDICINE

## 2021-08-04 PROCEDURE — 36415 COLL VENOUS BLD VENIPUNCTURE: CPT | Performed by: INTERNAL MEDICINE

## 2021-08-04 PROCEDURE — 80053 COMPREHEN METABOLIC PANEL: CPT | Performed by: INTERNAL MEDICINE

## 2021-08-04 RX ORDER — AMLODIPINE BESYLATE 10 MG/1
10 TABLET ORAL DAILY
Qty: 90 TABLET | Refills: 3 | Status: SHIPPED | OUTPATIENT
Start: 2021-08-04 | End: 2022-09-21

## 2021-08-04 RX ORDER — FLUTICASONE PROPIONATE 50 MCG
SPRAY, SUSPENSION (ML) NASAL PRN
COMMUNITY
Start: 2021-04-06

## 2021-08-04 ASSESSMENT — MIFFLIN-ST. JEOR: SCORE: 1901.25

## 2021-08-04 NOTE — LETTER
8/4/2021    ERICK CORTEZ  [facility] [address]  [facility] [address]    RE: Pastor Churchill       Dear Colleague,    I had the pleasure of seeing Pastor Churchill in the [facility].            Assessment/Recommendations   Patient with known hypertension which is not been well controlled at home.  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  Blood pressures at home in the 150s with diastolics around 100.  He is also had some mild erectile dysfunction.    We will increase his amlodipine to 10 mg a day.  We will check his renal arteries for stenosis and kidney size.  We will also check a comprehensive metabolic panel, CBC, and he is fasting today so we will get a repeat lipid panel.    We will call him with the results of the above testing and any further recommendations.    30 minutes spent today with chart review, patient visit, and documentation.       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  He has a family history of coronary artery disease although late presentation in his father.    The patient has been feeling reasonably well and denies any chest discomfort, shortness of breath with activity, orthopnea, paroxysmal nocturnal dyspnea, peripheral edema, syncope or near syncopal episodes.  His blood pressure is not been well controlled at home.  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  He is checked his cuff at clinics and is accurate.  Blood pressures at home been in the 150s and diastolics near 100.    He also does have some erectile dysfunction all no not debilitating at this point but it concerns him from a vascular disease standpoint.  Testosterone rechecked in the past was normal.  He does have a unilateral orchiectomy  "at age 54.         Physical Examination Review of Systems   /84 (BP Location: Right arm, Patient Position: Sitting, Cuff Size: Adult Large)   Pulse 91   Resp 20   Ht 1.854 m (6' 1\")   Wt 104.2 kg (229 lb 12.8 oz)   BMI 30.32 kg/m    Body mass index is 30.32 kg/m .  Wt Readings from Last 3 Encounters:   08/04/21 104.2 kg (229 lb 12.8 oz)   02/11/20 112 kg (247 lb)   02/13/06 98.2 kg (216 lb 6.4 oz)     General Appearance:   Alert, cooperative and in no acute distress.   ENT/Mouth: Patient wearing a mask.      EYES:  no scleral icterus, normal conjunctivae   Neck: JVP . No Hepatojugular reflux. Thyroid not visualized.   Chest/Lungs:   Lungs are clear to auscultation, equal chest wall expansion.   Cardiovascular:   S1, S2 without murmur ,clicks or rubs. Brachial, radial and posterior tibial pulses are intact and symetric. No carotid bruits noted   Abdomen:  Nontender. BS+.    Extremities: No cyanosis, clubbing or edema   Skin: no xanthelasma, warm.    Neurologic: normal arm movement bilateral, no tremors     Psychiatric: Appropriate affect.      Enc Vitals  BP: 130/84  Pulse: 91  Resp: 20  Weight: 104.2 kg (229 lb 12.8 oz)  Height: 185.4 cm (6' 1\")                                           Medical History  Surgical History Family History Social History   Past Medical History:   Diagnosis Date     Hypertension     Past Surgical History:   Procedure Laterality Date     ORCHIECTOMY SCROTAL      Family History   Problem Relation Age of Onset     CABG Father     Social History     Socioeconomic History     Marital status:      Spouse name: Not on file     Number of children: Not on file     Years of education: Not on file     Highest education level: Not on file   Occupational History     Not on file   Tobacco Use     Smoking status: Passive Smoke Exposure - Never Smoker     Smokeless tobacco: Never Used     Tobacco comment: no second hand smoke   Substance and Sexual Activity     Alcohol use: Yes     " Comment: occ.     Drug use: Not on file     Sexual activity: Not on file   Other Topics Concern     Not on file   Social History Narrative    ** Merged History Encounter **          Social Determinants of Health     Financial Resource Strain:      Difficulty of Paying Living Expenses:    Food Insecurity:      Worried About Running Out of Food in the Last Year:      Ran Out of Food in the Last Year:    Transportation Needs:      Lack of Transportation (Medical):      Lack of Transportation (Non-Medical):    Physical Activity:      Days of Exercise per Week:      Minutes of Exercise per Session:    Stress:      Feeling of Stress :    Social Connections:      Frequency of Communication with Friends and Family:      Frequency of Social Gatherings with Friends and Family:      Attends Denominational Services:      Active Member of Clubs or Organizations:      Attends Club or Organization Meetings:      Marital Status:    Intimate Partner Violence:      Fear of Current or Ex-Partner:      Emotionally Abused:      Physically Abused:      Sexually Abused:           Medications  Allergies   Current Outpatient Medications   Medication Sig Dispense Refill     amLODIPine (NORVASC) 10 MG tablet Take 1 tablet (10 mg) by mouth daily 90 tablet 3     atorvastatin (LIPITOR) 40 MG tablet [ATORVASTATIN (LIPITOR) 40 MG TABLET] Take 1 tablet (40 mg total) by mouth at bedtime. 90 tablet 1     finasteride (PROSCAR) 5 mg tablet [FINASTERIDE (PROSCAR) 5 MG TABLET] Take 1 tablet (5 mg total) by mouth daily. 90 tablet 3     fluticasone (FLONASE) 50 MCG/ACT nasal spray as needed       lisinopriL (PRINIVIL,ZESTRIL) 20 MG tablet [LISINOPRIL (PRINIVIL,ZESTRIL) 20 MG TABLET] Take 1 tablet (20 mg total) by mouth daily. 90 tablet 1     ASPIRIN 325 MG OR TBEC 2 tabs prn (Patient not taking: No sig reported)       TYLENOL ALLERGY SINUS 2-25- MG OR TABS None Entered (Patient not taking: No sig reported)      Allergies   Allergen Reactions     Dust  Mites          Lab Results    Chemistry/lipid CBC Cardiac Enzymes/BNP/TSH/INR   Lab Results   Component Value Date    CHOL 123 02/12/2020    HDL 30 (L) 02/12/2020    TRIG 99 02/12/2020    BUN 21 08/25/2020     (L) 08/25/2020    CO2 21 (L) 08/25/2020    Lab Results   Component Value Date    WBC 7.6 02/11/2020    HGB 14.9 02/11/2020    HCT 44.3 02/11/2020    MCV 91 02/11/2020     02/11/2020    No results found for: CKTOTAL, CKMB, TROPONINI, BNP, TSH, INR                                            Thank you for allowing me to participate in the care of your patient.      Sincerely,     Cain Worthy MD     [facility]  cc:   No referring provider defined for this encounter.

## 2021-08-04 NOTE — PROGRESS NOTES
"        Assessment/Recommendations   Patient with known hypertension which is not been well controlled at home.  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  Blood pressures at home in the 150s with diastolics around 100.  He is also had some mild erectile dysfunction.    We will increase his amlodipine to 10 mg a day.  We will check his renal arteries for stenosis and kidney size.  We will also check a comprehensive metabolic panel, CBC, and he is fasting today so we will get a repeat lipid panel.    We will call him with the results of the above testing and any further recommendations.    30 minutes spent today with chart review, patient visit, and documentation.       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  He has a family history of coronary artery disease although late presentation in his father.    The patient has been feeling reasonably well and denies any chest discomfort, shortness of breath with activity, orthopnea, paroxysmal nocturnal dyspnea, peripheral edema, syncope or near syncopal episodes.  His blood pressure is not been well controlled at home.  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  He is checked his cuff at clinics and is accurate.  Blood pressures at home been in the 150s and diastolics near 100.    He also does have some erectile dysfunction all no not debilitating at this point but it concerns him from a vascular disease standpoint.  Testosterone rechecked in the past was normal.  He does have a unilateral orchiectomy at age 54.         Physical Examination Review of Systems   /84 (BP Location: Right arm, Patient Position: Sitting, Cuff Size: Adult Large)   Pulse 91   Resp 20   Ht 1.854 m (6' 1\")   Wt 104.2 kg (229 lb 12.8 oz)   BMI 30.32 kg/m    Body mass index is 30.32 " "kg/m .  Wt Readings from Last 3 Encounters:   08/04/21 104.2 kg (229 lb 12.8 oz)   02/11/20 112 kg (247 lb)   02/13/06 98.2 kg (216 lb 6.4 oz)     General Appearance:   Alert, cooperative and in no acute distress.   ENT/Mouth: Patient wearing a mask.      EYES:  no scleral icterus, normal conjunctivae   Neck: JVP . No Hepatojugular reflux. Thyroid not visualized.   Chest/Lungs:   Lungs are clear to auscultation, equal chest wall expansion.   Cardiovascular:   S1, S2 without murmur ,clicks or rubs. Brachial, radial and posterior tibial pulses are intact and symetric. No carotid bruits noted   Abdomen:  Nontender. BS+.    Extremities: No cyanosis, clubbing or edema   Skin: no xanthelasma, warm.    Neurologic: normal arm movement bilateral, no tremors     Psychiatric: Appropriate affect.      Enc Vitals  BP: 130/84  Pulse: 91  Resp: 20  Weight: 104.2 kg (229 lb 12.8 oz)  Height: 185.4 cm (6' 1\")                                           Medical History  Surgical History Family History Social History   Past Medical History:   Diagnosis Date     Hypertension     Past Surgical History:   Procedure Laterality Date     ORCHIECTOMY SCROTAL      Family History   Problem Relation Age of Onset     CABG Father     Social History     Socioeconomic History     Marital status:      Spouse name: Not on file     Number of children: Not on file     Years of education: Not on file     Highest education level: Not on file   Occupational History     Not on file   Tobacco Use     Smoking status: Passive Smoke Exposure - Never Smoker     Smokeless tobacco: Never Used     Tobacco comment: no second hand smoke   Substance and Sexual Activity     Alcohol use: Yes     Comment: occ.     Drug use: Not on file     Sexual activity: Not on file   Other Topics Concern     Not on file   Social History Narrative    ** Merged History Encounter **          Social Determinants of Health     Financial Resource Strain:      Difficulty of Paying " Living Expenses:    Food Insecurity:      Worried About Running Out of Food in the Last Year:      Ran Out of Food in the Last Year:    Transportation Needs:      Lack of Transportation (Medical):      Lack of Transportation (Non-Medical):    Physical Activity:      Days of Exercise per Week:      Minutes of Exercise per Session:    Stress:      Feeling of Stress :    Social Connections:      Frequency of Communication with Friends and Family:      Frequency of Social Gatherings with Friends and Family:      Attends Ep[employer]op[employer] Services:      Active Member of Clubs or Organizations:      Attends Club or Organization Meetings:      Marital Status:    Intimate Partner Violence:      Fear of Current or Ex-Partner:      Emotionally Abused:      Physically Abused:      Sexually Abused:           Medications  Allergies   Current Outpatient Medications   Medication Sig Dispense Refill     amLODIPine (NORVASC) 10 MG tablet Take 1 tablet (10 mg) by mouth daily 90 tablet 3     atorvastatin (LIPITOR) 40 MG tablet [ATORVASTATIN (LIPITOR) 40 MG TABLET] Take 1 tablet (40 mg total) by mouth at bedtime. 90 tablet 1     finasteride (PROSCAR) 5 mg tablet [FINASTERIDE (PROSCAR) 5 MG TABLET] Take 1 tablet (5 mg total) by mouth daily. 90 tablet 3     fluticasone (FLONASE) 50 MCG/ACT nasal spray as needed       lisinopriL (PRINIVIL,ZESTRIL) 20 MG tablet [LISINOPRIL (PRINIVIL,ZESTRIL) 20 MG TABLET] Take 1 tablet (20 mg total) by mouth daily. 90 tablet 1     ASPIRIN 325 MG OR TBEC 2 tabs prn (Patient not taking: No sig reported)       TYLENOL ALLERGY SINUS 2-25- MG OR TABS None Entered (Patient not taking: No sig reported)      Allergies   Allergen Reactions     Dust Mites          Lab Results    Chemistry/lipid CBC Cardiac Enzymes/BNP/TSH/INR   Lab Results   Component Value Date    CHOL 123 02/12/2020    HDL 30 (L) 02/12/2020    TRIG 99 02/12/2020    BUN 21 08/25/2020     (L) 08/25/2020    CO2 21 (L) 08/25/2020    Lab Results    Component Value Date    WBC 7.6 02/11/2020    HGB 14.9 02/11/2020    HCT 44.3 02/11/2020    MCV 91 02/11/2020     02/11/2020    No results found for: CKTOTAL, CKMB, TROPONINI, BNP, TSH, INR

## 2021-08-12 ENCOUNTER — OFFICE VISIT (OUTPATIENT)
Dept: FAMILY MEDICINE | Facility: CLINIC | Age: 61
End: 2021-08-12
Payer: COMMERCIAL

## 2021-08-12 VITALS
WEIGHT: 231 LBS | TEMPERATURE: 99 F | BODY MASS INDEX: 30.48 KG/M2 | SYSTOLIC BLOOD PRESSURE: 103 MMHG | RESPIRATION RATE: 16 BRPM | HEART RATE: 97 BPM | OXYGEN SATURATION: 97 % | DIASTOLIC BLOOD PRESSURE: 71 MMHG

## 2021-08-12 DIAGNOSIS — J20.9 ACUTE BRONCHITIS, UNSPECIFIED ORGANISM: Primary | ICD-10-CM

## 2021-08-12 PROCEDURE — 99213 OFFICE O/P EST LOW 20 MIN: CPT | Performed by: FAMILY MEDICINE

## 2021-08-12 RX ORDER — CODEINE PHOSPHATE AND GUAIFENESIN 10; 100 MG/5ML; MG/5ML
1-2 SOLUTION ORAL EVERY 4 HOURS PRN
Qty: 120 ML | Refills: 0 | Status: SHIPPED | OUTPATIENT
Start: 2021-08-12 | End: 2021-08-27

## 2021-08-12 RX ORDER — BENZONATATE 200 MG/1
200 CAPSULE ORAL 3 TIMES DAILY PRN
Qty: 30 CAPSULE | Refills: 0 | Status: SHIPPED | OUTPATIENT
Start: 2021-08-12 | End: 2023-06-15

## 2021-08-16 ENCOUNTER — HOSPITAL ENCOUNTER (OUTPATIENT)
Dept: CARDIOLOGY | Facility: CLINIC | Age: 61
End: 2021-08-16
Attending: INTERNAL MEDICINE
Payer: COMMERCIAL

## 2021-08-16 ENCOUNTER — LAB (OUTPATIENT)
Dept: LAB | Facility: CLINIC | Age: 61
End: 2021-08-16
Attending: INTERNAL MEDICINE
Payer: COMMERCIAL

## 2021-08-16 DIAGNOSIS — I10 BENIGN ESSENTIAL HYPERTENSION: ICD-10-CM

## 2021-08-16 LAB
CHOLEST SERPL-MCNC: 133 MG/DL
FASTING STATUS PATIENT QL REPORTED: YES
HDLC SERPL-MCNC: 23 MG/DL
LDLC SERPL CALC-MCNC: 83 MG/DL
LVEF ECHO: NORMAL
TRIGL SERPL-MCNC: 135 MG/DL

## 2021-08-16 PROCEDURE — 93306 TTE W/DOPPLER COMPLETE: CPT | Mod: 26 | Performed by: INTERNAL MEDICINE

## 2021-08-16 PROCEDURE — 82465 ASSAY BLD/SERUM CHOLESTEROL: CPT

## 2021-08-16 PROCEDURE — 36415 COLL VENOUS BLD VENIPUNCTURE: CPT

## 2021-08-16 PROCEDURE — 93306 TTE W/DOPPLER COMPLETE: CPT

## 2021-08-18 ENCOUNTER — HOSPITAL ENCOUNTER (OUTPATIENT)
Dept: ULTRASOUND IMAGING | Facility: CLINIC | Age: 61
Discharge: HOME | End: 2021-08-18
Attending: INTERNAL MEDICINE | Admitting: INTERNAL MEDICINE
Payer: COMMERCIAL

## 2021-08-18 DIAGNOSIS — I10 BENIGN ESSENTIAL HYPERTENSION: ICD-10-CM

## 2021-08-18 PROCEDURE — 93975 VASCULAR STUDY: CPT

## 2021-08-18 PROCEDURE — 76770 US EXAM ABDO BACK WALL COMP: CPT

## 2021-08-19 NOTE — PROGRESS NOTES
Assessment:   Acute bronchitis     Plan:   Patient with acute bronchitis, likely viral.  No antibiotics indicated at this time.  Prescription given for Robitussin with codeine and Tessalon Perles.  Follow-up if symptoms getting worse or not improving.    MEDICATIONS:   Orders Placed This Encounter   Medications     benzonatate (TESSALON) 200 MG capsule     Sig: Take 1 capsule (200 mg) by mouth 3 times daily as needed for cough     Dispense:  30 capsule     Refill:  0     guaiFENesin-codeine (ROBITUSSIN AC) 100-10 MG/5ML solution     Sig: Take 5-10 mLs by mouth every 4 hours as needed for cough     Dispense:  120 mL     Refill:  0       Subjective:       60 year old male presents for evaluation of a 2-week history of sore throat, headache, cough, and some body aches.  He had a lot of fatigue at that time.  He was tested for COVID-19 and results were negative.  The symptoms have essentially resolved except for the cough which is continued.  It is worse at night.  He has a lot of mucus in his cough has been productive.  He tried Mucinex, Tylenol, and ibuprofen without much relief of his symptoms.  He has not had any fevers, chills, shortness of breath, or wheezing.  He is wondering what else he can do for the cough.    Patient Active Problem List   Diagnosis     Benign essential hypertension       Past Medical History:   Diagnosis Date     Hypertension        Past Surgical History:   Procedure Laterality Date     ORCHIECTOMY SCROTAL         Current Outpatient Medications   Medication     amLODIPine (NORVASC) 10 MG tablet     atorvastatin (LIPITOR) 40 MG tablet     benzonatate (TESSALON) 200 MG capsule     finasteride (PROSCAR) 5 mg tablet     guaiFENesin-codeine (ROBITUSSIN AC) 100-10 MG/5ML solution     lisinopriL (PRINIVIL,ZESTRIL) 20 MG tablet     ASPIRIN 325 MG OR TBEC     fluticasone (FLONASE) 50 MCG/ACT nasal spray     TYLENOL ALLERGY SINUS 2-25- MG OR TABS     No current facility-administered medications  for this visit.       Allergies   Allergen Reactions     Dust Mites        Family History   Problem Relation Age of Onset     CABG Father        Social History     Socioeconomic History     Marital status:      Spouse name: None     Number of children: None     Years of education: None     Highest education level: None   Occupational History     None   Tobacco Use     Smoking status: Passive Smoke Exposure - Never Smoker     Smokeless tobacco: Never Used     Tobacco comment: no second hand smoke   Substance and Sexual Activity     Alcohol use: Yes     Comment: occ.     Drug use: None     Sexual activity: None   Other Topics Concern     None   Social History Narrative    ** Merged History Encounter **          Social Determinants of Health     Financial Resource Strain:      Difficulty of Paying Living Expenses:    Food Insecurity:      Worried About Running Out of Food in the Last Year:      Ran Out of Food in the Last Year:    Transportation Needs:      Lack of Transportation (Medical):      Lack of Transportation (Non-Medical):    Physical Activity:      Days of Exercise per Week:      Minutes of Exercise per Session:    Stress:      Feeling of Stress :    Social Connections:      Frequency of Communication with Friends and Family:      Frequency of Social Gatherings with Friends and Family:      Attends [employer] Services:      Active Member of Clubs or Organizations:      Attends Club or Organization Meetings:      Marital Status:    Intimate Partner Violence:      Fear of Current or Ex-Partner:      Emotionally Abused:      Physically Abused:      Sexually Abused:          Review of Systems  A comprehensive review of systems was negative.      Objective:                    eneral Appearance:    /71   Pulse 97   Temp 99  F (37.2  C) (Oral)   Resp 16   Wt 104.8 kg (231 lb)   SpO2 97%   BMI 30.48 kg/m          Alert, pleasant, cooperative, no distress, appears stated age   Head:     Normocephalic, without obvious abnormality, atraumatic   Eyes:    Conjunctiva/corneas clear   Ears:    Normal TM's without erythema or bulging. Normal external ear canals, both ears   Nose:   Nares normal, septum midline, mucosa normal, no drainage    or sinus tenderness   Throat:   Lips, mucosa, and tongue normal; teeth and gums normal.  No tonsilar hypertrophy or exudate.   Neck:   Supple, symmetrical, trachea midline, no adenopathy    Lungs:     Clear to auscultation bilaterally without wheezes, rales, or rhonchi, respirations unlabored    Heart:    Regular rate and rhythm, S1 and S2 normal, no murmur, rub or gallop       Extremities:   Extremities normal, atraumatic, no cyanosis or edema   Skin:   Skin color, texture, turgor normal, no rashes or lesions                This note has been dictated using voice recognition software. Any grammatical or context distortions are unintentional and inherent to the software

## 2021-08-25 ENCOUNTER — TRANSFERRED RECORDS (OUTPATIENT)
Dept: HEALTH INFORMATION MANAGEMENT | Facility: CLINIC | Age: 61
End: 2021-08-25

## 2021-08-27 ENCOUNTER — OFFICE VISIT (OUTPATIENT)
Dept: CARDIOLOGY | Facility: CLINIC | Age: 61
End: 2021-08-27
Payer: COMMERCIAL

## 2021-08-27 VITALS
BODY MASS INDEX: 30.75 KG/M2 | DIASTOLIC BLOOD PRESSURE: 62 MMHG | WEIGHT: 232 LBS | SYSTOLIC BLOOD PRESSURE: 100 MMHG | HEART RATE: 88 BPM | RESPIRATION RATE: 16 BRPM | HEIGHT: 73 IN

## 2021-08-27 DIAGNOSIS — I10 BENIGN ESSENTIAL HYPERTENSION: ICD-10-CM

## 2021-08-27 DIAGNOSIS — N52.9 VASCULOGENIC ERECTILE DYSFUNCTION, UNSPECIFIED VASCULOGENIC ERECTILE DYSFUNCTION TYPE: ICD-10-CM

## 2021-08-27 PROCEDURE — 99213 OFFICE O/P EST LOW 20 MIN: CPT | Performed by: INTERNAL MEDICINE

## 2021-08-27 RX ORDER — FLUTICASONE PROPIONATE 220 UG/1
1 AEROSOL, METERED RESPIRATORY (INHALATION) 2 TIMES DAILY
COMMUNITY
Start: 2021-08-24 | End: 2022-09-29

## 2021-08-27 RX ORDER — ALBUTEROL SULFATE 90 UG/1
2 AEROSOL, METERED RESPIRATORY (INHALATION) EVERY 4 HOURS PRN
COMMUNITY
Start: 2021-08-24

## 2021-08-27 RX ORDER — SILDENAFIL 100 MG/1
50 TABLET, FILM COATED ORAL DAILY PRN
Qty: 20 TABLET | Refills: 4 | Status: SHIPPED | OUTPATIENT
Start: 2021-08-27 | End: 2022-04-22

## 2021-08-27 RX ORDER — ZOLPIDEM TARTRATE 5 MG/1
5 TABLET ORAL
COMMUNITY
Start: 2021-08-17

## 2021-08-27 ASSESSMENT — MIFFLIN-ST. JEOR: SCORE: 1911.23

## 2021-08-27 NOTE — PROGRESS NOTES
Today we reviewed the results of his blood work which was all normal, his echocardiogram which was normal with exception of borderline enlargement of the ascending aorta and his renal ultrasound which did not suggest renal artery stenosis.  With increase of his amlodipine his blood pressure has been excellent and today is excellent.  He had a measurement 2 days ago which was also excellent.    We talked about exercise and recommended 30 minutes at least 5 times a week or 30 minutes 3 times a week of more vigorous exercise.  We also talked about reducing carbohydrates in his diet and the combination along with exercise would undoubtedly result in some weight loss which he desires as well.    He does have some erectile dysfunction and we will prescribe him some Viagra which he can try at 50 mg.  This prescription has been sent in.    We will plan on seeing him back in 1 year but he will keep us posted on his blood pressures.    Thank you for allowing us to participate in his care.

## 2021-08-27 NOTE — LETTER
8/27/2021    ERICK CORTEZ  [facility] [address]  [facility] [address]    RE: Pastor Churchill       Dear Colleague,    I had the pleasure of seeing Pastor Churchill in the [facility].    Today we reviewed the results of his blood work which was all normal, his echocardiogram which was normal with exception of borderline enlargement of the ascending aorta and his renal ultrasound which did not suggest renal artery stenosis.  With increase of his amlodipine his blood pressure has been excellent and today is excellent.  He had a measurement 2 days ago which was also excellent.    We talked about exercise and recommended 30 minutes at least 5 times a week or 30 minutes 3 times a week of more vigorous exercise.  We also talked about reducing carbohydrates in his diet and the combination along with exercise would undoubtedly result in some weight loss which he desires as well.    He does have some erectile dysfunction and we will prescribe him some Viagra which he can try at 50 mg.  This prescription has been sent in.    We will plan on seeing him back in 1 year but he will keep us posted on his blood pressures.    Thank you for allowing us to participate in his care.      Thank you for allowing me to participate in the care of your patient.      Sincerely,     Cain Worthy MD     [facility]  cc:   Cain Worthy MD  [address]  [address] [place] [address]

## 2021-09-19 ENCOUNTER — HEALTH MAINTENANCE LETTER (OUTPATIENT)
Age: 61
End: 2021-09-19

## 2022-01-09 ENCOUNTER — HEALTH MAINTENANCE LETTER (OUTPATIENT)
Age: 62
End: 2022-01-09

## 2022-09-21 DIAGNOSIS — I10 BENIGN ESSENTIAL HYPERTENSION: ICD-10-CM

## 2022-09-21 RX ORDER — AMLODIPINE BESYLATE 10 MG/1
TABLET ORAL
Qty: 90 TABLET | Refills: 0 | Status: SHIPPED | OUTPATIENT
Start: 2022-09-21 | End: 2022-09-29

## 2022-09-29 ENCOUNTER — OFFICE VISIT (OUTPATIENT)
Dept: CARDIOLOGY | Facility: CLINIC | Age: 62
End: 2022-09-29
Payer: COMMERCIAL

## 2022-09-29 VITALS
DIASTOLIC BLOOD PRESSURE: 76 MMHG | HEIGHT: 73 IN | HEART RATE: 93 BPM | RESPIRATION RATE: 18 BRPM | OXYGEN SATURATION: 97 % | WEIGHT: 237.3 LBS | BODY MASS INDEX: 31.45 KG/M2 | SYSTOLIC BLOOD PRESSURE: 124 MMHG

## 2022-09-29 DIAGNOSIS — N52.01 ERECTILE DYSFUNCTION DUE TO ARTERIAL INSUFFICIENCY: ICD-10-CM

## 2022-09-29 DIAGNOSIS — I10 BENIGN ESSENTIAL HYPERTENSION: Primary | ICD-10-CM

## 2022-09-29 DIAGNOSIS — E78.49 OTHER HYPERLIPIDEMIA: ICD-10-CM

## 2022-09-29 PROCEDURE — 99214 OFFICE O/P EST MOD 30 MIN: CPT | Performed by: INTERNAL MEDICINE

## 2022-09-29 RX ORDER — TADALAFIL 10 MG/1
10 TABLET ORAL EVERY 24 HOURS
Qty: 90 TABLET | Refills: 3 | Status: SHIPPED | OUTPATIENT
Start: 2022-09-29 | End: 2023-06-15

## 2022-09-29 RX ORDER — AMLODIPINE BESYLATE 10 MG/1
10 TABLET ORAL DAILY
Qty: 90 EACH | Refills: 3 | Status: SHIPPED | OUTPATIENT
Start: 2022-09-29 | End: 2023-04-10

## 2022-09-29 RX ORDER — FINASTERIDE 5 MG/1
1 TABLET, FILM COATED ORAL DAILY
Qty: 90 EACH | Refills: 3 | Status: SHIPPED | OUTPATIENT
Start: 2022-09-29

## 2022-09-29 RX ORDER — ATORVASTATIN CALCIUM 40 MG/1
40 TABLET, FILM COATED ORAL AT BEDTIME
Qty: 90 EACH | Refills: 3 | Status: SHIPPED | OUTPATIENT
Start: 2022-09-29 | End: 2023-06-15

## 2022-09-29 NOTE — LETTER
"9/29/2022    ERICK CORTEZ  [facility] [address]  [facility] [address]    RE: Pastor Churchill       Dear Colleague,     I had the pleasure of seeing Pastor Churchill in the [facility].      [facility]  [phone]      Assessment/Recommendations   Patient with known hypertension, family history of coronary artery disease and hyperlipidemia.  He has responded nicely to amlodipine 10 mg a day his blood pressure today is at goal.  He is not exercising as much as he would like and I have strongly encouraged him to exercise at least 30 minutes and at least 5 times each week.  He will take that under advisement.    He will stop in tomorrow or in the next few days for fasting lipid panel, complete metabolic panel and CBC as he has not had any blood work in over a year.  We will renew his medications.    We will plan on seeing him back in 1 year, but of course to be happy to see him sooner if questions or problems arise.  30 minutes spent with chart review, patient visit, and documentation.     History of Present Illness/Subjective    Mr. Pastor Churchill is a 61 year old male with known hyperlipidemia, positive family history of coronary artery disease, and hypertension.  He has been quite stable this past year.  He admits that he should lose some weight.  He does productive exercise, chopping wood, mowing the lawn but does not exercise on a regular basis or go to the gym.  He has not been doing the productive exercise as regularly as he would like either.  He denies any new symptomatology and specifically denies shortness of breath with activity, orthopnea, paroxysmal nocturnal dyspnea, peripheral edema, syncope or near syncopal episodes he has not had any chest discomfort.           Physical Examination Review of Systems   /76 (BP Location: Left arm, Patient Position: Sitting, Cuff Size: Adult Regular)   Pulse 93   Resp 18   Ht 1.854 m (6' 1\")   Wt " "107.6 kg (237 lb 4.8 oz)   SpO2 97%   BMI 31.31 kg/m    Body mass index is 31.31 kg/m .  Wt Readings from Last 3 Encounters:   09/29/22 107.6 kg (237 lb 4.8 oz)   08/27/21 105.2 kg (232 lb)   08/12/21 104.8 kg (231 lb)     General Appearance:   Alert, cooperative and in no acute distress.   ENT/Mouth: Patient wearing a mask.      EYES:  no scleral icterus, normal conjunctivae   Neck: JVP normal. No Hepatojugular reflux. Thyroid not visualized.   Chest/Lungs:   Lungs are clear to auscultation, equal chest wall expansion.   Cardiovascular:   S1, S2 without murmur ,clicks or rubs. Brachial, radial and posterior tibial pulses are intact and symetric. No carotid bruits noted   Abdomen:  Nontender. BS+.    Extremities: No cyanosis, clubbing or edema   Skin: no xanthelasma, warm.    Neurologic: normal arm movement bilateral, no tremors     Psychiatric: Appropriate affect.      Enc Vitals  BP: 124/76  Pulse: 93  Resp: 18  SpO2: 97 %  Weight: 107.6 kg (237 lb 4.8 oz)  Height: 185.4 cm (6' 1\")                                           Medical History  Surgical History Family History Social History   Past Medical History:   Diagnosis Date     Hypertension     Past Surgical History:   Procedure Laterality Date     ORCHIECTOMY SCROTAL      Family History   Problem Relation Age of Onset     CABG Father     Social History     Socioeconomic History     Marital status:      Spouse name: Not on file     Number of children: Not on file     Years of education: Not on file     Highest education level: Not on file   Occupational History     Not on file   Tobacco Use     Smoking status: Passive Smoke Exposure - Never Smoker     Smokeless tobacco: Never Used     Tobacco comment: no second hand smoke   Substance and Sexual Activity     Alcohol use: Yes     Comment: occ.     Drug use: Not on file     Sexual activity: Not on file   Other Topics Concern     Not on file   Social History Narrative    ** Merged History Encounter **      "     Social Determinants of Health     Financial Resource Strain: Not on file   Food Insecurity: Not on file   Transportation Needs: Not on file   Physical Activity: Not on file   Stress: Not on file   Social Connections: Not on file   Intimate Partner Violence: Not on file   Housing Stability: Not on file          Medications  Allergies   Current Outpatient Medications   Medication Sig Dispense Refill     albuterol (PROAIR HFA/PROVENTIL HFA/VENTOLIN HFA) 108 (90 Base) MCG/ACT inhaler Inhale 2 puffs into the lungs every 4 hours as needed       amLODIPine (NORVASC) 10 MG tablet TAKE ONE TABLET BY MOUTH ONE TIME DAILY 90 tablet 0     atorvastatin (LIPITOR) 40 MG tablet TAKE 1 TABLET BY MOUTH AT BEDTIME 90 tablet 1     benzonatate (TESSALON) 200 MG capsule Take 1 capsule (200 mg) by mouth 3 times daily as needed for cough 30 capsule 0     finasteride (PROSCAR) 5 MG tablet TAKE ONE TABLET BY MOUTH ONE TIME DAILY 90 tablet 1     fluticasone (FLONASE) 50 MCG/ACT nasal spray as needed        tadalafil (CIALIS) 10 MG tablet Take 1 tablet (10 mg) by mouth every 24 hours 90 tablet 3     TYLENOL ALLERGY SINUS 2-25- MG OR TABS        zolpidem (AMBIEN) 5 MG tablet Take 5 mg by mouth nightly as needed      Allergies   Allergen Reactions     Dust Mites          Lab Results    Chemistry/lipid CBC Cardiac Enzymes/BNP/TSH/INR   Lab Results   Component Value Date    CHOL 133 08/16/2021    HDL 23 (L) 08/16/2021    TRIG 135 08/16/2021    BUN 20 08/04/2021     08/04/2021    CO2 24 08/04/2021    Lab Results   Component Value Date    WBC 10.9 08/04/2021    HGB 14.0 08/04/2021    HCT 42.3 08/04/2021    MCV 89 08/04/2021     08/04/2021    No results found for: CKTOTAL, CKMB, TROPONINI, BNP, TSH, INR             Thank you for allowing me to participate in the care of your patient.      Sincerely,     Cain Worthy MD     [facility]  cc:   Cain Worthy MD  [address]  [facility] [address]  [place] [address]

## 2022-09-29 NOTE — PROGRESS NOTES
"    [facility]  [phone]      Assessment/Recommendations   Patient with known hypertension, family history of coronary artery disease and hyperlipidemia.  He has responded nicely to amlodipine 10 mg a day his blood pressure today is at goal.  He is not exercising as much as he would like and I have strongly encouraged him to exercise at least 30 minutes and at least 5 times each week.  He will take that under advisement.    He will stop in tomorrow or in the next few days for fasting lipid panel, complete metabolic panel and CBC as he has not had any blood work in over a year.  We will renew his medications.    We will plan on seeing him back in 1 year, but of course to be happy to see him sooner if questions or problems arise.  30 minutes spent with chart review, patient visit, and documentation.     History of Present Illness/Subjective    Mr. Pastor Churchill is a 61 year old male with known hyperlipidemia, positive family history of coronary artery disease, and hypertension.  He has been quite stable this past year.  He admits that he should lose some weight.  He does productive exercise, chopping wood, mowing the lawn but does not exercise on a regular basis or go to the gym.  He has not been doing the productive exercise as regularly as he would like either.  He denies any new symptomatology and specifically denies shortness of breath with activity, orthopnea, paroxysmal nocturnal dyspnea, peripheral edema, syncope or near syncopal episodes he has not had any chest discomfort.           Physical Examination Review of Systems   /76 (BP Location: Left arm, Patient Position: Sitting, Cuff Size: Adult Regular)   Pulse 93   Resp 18   Ht 1.854 m (6' 1\")   Wt 107.6 kg (237 lb 4.8 oz)   SpO2 97%   BMI 31.31 kg/m    Body mass index is 31.31 kg/m .  Wt Readings from Last 3 Encounters:   09/29/22 107.6 kg (237 lb 4.8 oz)   08/27/21 105.2 kg (232 lb)   08/12/21 104.8 kg (231 lb) " "    General Appearance:   Alert, cooperative and in no acute distress.   ENT/Mouth: Patient wearing a mask.      EYES:  no scleral icterus, normal conjunctivae   Neck: JVP normal. No Hepatojugular reflux. Thyroid not visualized.   Chest/Lungs:   Lungs are clear to auscultation, equal chest wall expansion.   Cardiovascular:   S1, S2 without murmur ,clicks or rubs. Brachial, radial and posterior tibial pulses are intact and symetric. No carotid bruits noted   Abdomen:  Nontender. BS+.    Extremities: No cyanosis, clubbing or edema   Skin: no xanthelasma, warm.    Neurologic: normal arm movement bilateral, no tremors     Psychiatric: Appropriate affect.      Enc Vitals  BP: 124/76  Pulse: 93  Resp: 18  SpO2: 97 %  Weight: 107.6 kg (237 lb 4.8 oz)  Height: 185.4 cm (6' 1\")                                           Medical History  Surgical History Family History Social History   Past Medical History:   Diagnosis Date     Hypertension     Past Surgical History:   Procedure Laterality Date     ORCHIECTOMY SCROTAL      Family History   Problem Relation Age of Onset     CABG Father     Social History     Socioeconomic History     Marital status:      Spouse name: Not on file     Number of children: Not on file     Years of education: Not on file     Highest education level: Not on file   Occupational History     Not on file   Tobacco Use     Smoking status: Passive Smoke Exposure - Never Smoker     Smokeless tobacco: Never Used     Tobacco comment: no second hand smoke   Substance and Sexual Activity     Alcohol use: Yes     Comment: occ.     Drug use: Not on file     Sexual activity: Not on file   Other Topics Concern     Not on file   Social History Narrative    ** Merged History Encounter **          Social Determinants of Health     Financial Resource Strain: Not on file   Food Insecurity: Not on file   Transportation Needs: Not on file   Physical Activity: Not on file   Stress: Not on file   Social Connections: " Not on file   Intimate Partner Violence: Not on file   Housing Stability: Not on file          Medications  Allergies   Current Outpatient Medications   Medication Sig Dispense Refill     albuterol (PROAIR HFA/PROVENTIL HFA/VENTOLIN HFA) 108 (90 Base) MCG/ACT inhaler Inhale 2 puffs into the lungs every 4 hours as needed       amLODIPine (NORVASC) 10 MG tablet TAKE ONE TABLET BY MOUTH ONE TIME DAILY 90 tablet 0     atorvastatin (LIPITOR) 40 MG tablet TAKE 1 TABLET BY MOUTH AT BEDTIME 90 tablet 1     benzonatate (TESSALON) 200 MG capsule Take 1 capsule (200 mg) by mouth 3 times daily as needed for cough 30 capsule 0     finasteride (PROSCAR) 5 MG tablet TAKE ONE TABLET BY MOUTH ONE TIME DAILY 90 tablet 1     fluticasone (FLONASE) 50 MCG/ACT nasal spray as needed        tadalafil (CIALIS) 10 MG tablet Take 1 tablet (10 mg) by mouth every 24 hours 90 tablet 3     TYLENOL ALLERGY SINUS 2-25- MG OR TABS        zolpidem (AMBIEN) 5 MG tablet Take 5 mg by mouth nightly as needed      Allergies   Allergen Reactions     Dust Mites          Lab Results    Chemistry/lipid CBC Cardiac Enzymes/BNP/TSH/INR   Lab Results   Component Value Date    CHOL 133 08/16/2021    HDL 23 (L) 08/16/2021    TRIG 135 08/16/2021    BUN 20 08/04/2021     08/04/2021    CO2 24 08/04/2021    Lab Results   Component Value Date    WBC 10.9 08/04/2021    HGB 14.0 08/04/2021    HCT 42.3 08/04/2021    MCV 89 08/04/2021     08/04/2021    No results found for: CKTOTAL, CKMB, TROPONINI, BNP, TSH, INR

## 2022-09-30 ENCOUNTER — LAB (OUTPATIENT)
Dept: LAB | Facility: CLINIC | Age: 62
End: 2022-09-30
Payer: COMMERCIAL

## 2022-09-30 DIAGNOSIS — I10 BENIGN ESSENTIAL HYPERTENSION: ICD-10-CM

## 2022-09-30 LAB
ALBUMIN SERPL BCG-MCNC: 4.6 G/DL (ref 3.5–5.2)
ALP SERPL-CCNC: 59 U/L (ref 40–129)
ALT SERPL W P-5'-P-CCNC: 54 U/L (ref 10–50)
ANION GAP SERPL CALCULATED.3IONS-SCNC: 11 MMOL/L (ref 7–15)
AST SERPL W P-5'-P-CCNC: 32 U/L (ref 10–50)
BILIRUB SERPL-MCNC: 0.2 MG/DL
BUN SERPL-MCNC: 21.2 MG/DL (ref 8–23)
CALCIUM SERPL-MCNC: 9.2 MG/DL (ref 8.8–10.2)
CHLORIDE SERPL-SCNC: 103 MMOL/L (ref 98–107)
CHOLEST SERPL-MCNC: 160 MG/DL
CREAT SERPL-MCNC: 0.95 MG/DL (ref 0.67–1.17)
DEPRECATED HCO3 PLAS-SCNC: 24 MMOL/L (ref 22–29)
ERYTHROCYTE [DISTWIDTH] IN BLOOD BY AUTOMATED COUNT: 13.4 % (ref 10–15)
GFR SERPL CREATININE-BSD FRML MDRD: 90 ML/MIN/1.73M2
GLUCOSE SERPL-MCNC: 114 MG/DL (ref 70–99)
HCT VFR BLD AUTO: 43.9 % (ref 40–53)
HDLC SERPL-MCNC: 28 MG/DL
HGB BLD-MCNC: 14.7 G/DL (ref 13.3–17.7)
LDLC SERPL CALC-MCNC: 112 MG/DL
MCH RBC QN AUTO: 29.4 PG (ref 26.5–33)
MCHC RBC AUTO-ENTMCNC: 33.5 G/DL (ref 31.5–36.5)
MCV RBC AUTO: 88 FL (ref 78–100)
NONHDLC SERPL-MCNC: 132 MG/DL
PLATELET # BLD AUTO: 176 10E3/UL (ref 150–450)
POTASSIUM SERPL-SCNC: 3.9 MMOL/L (ref 3.4–5.3)
PROT SERPL-MCNC: 7.2 G/DL (ref 6.4–8.3)
RBC # BLD AUTO: 5 10E6/UL (ref 4.4–5.9)
SODIUM SERPL-SCNC: 138 MMOL/L (ref 136–145)
TRIGL SERPL-MCNC: 100 MG/DL
WBC # BLD AUTO: 8.8 10E3/UL (ref 4–11)

## 2022-09-30 PROCEDURE — 85027 COMPLETE CBC AUTOMATED: CPT

## 2022-09-30 PROCEDURE — 80061 LIPID PANEL: CPT

## 2022-09-30 PROCEDURE — 80053 COMPREHEN METABOLIC PANEL: CPT

## 2022-09-30 PROCEDURE — 36415 COLL VENOUS BLD VENIPUNCTURE: CPT

## 2022-11-21 ENCOUNTER — HEALTH MAINTENANCE LETTER (OUTPATIENT)
Age: 62
End: 2022-11-21

## 2023-04-16 ENCOUNTER — HEALTH MAINTENANCE LETTER (OUTPATIENT)
Age: 63
End: 2023-04-16

## 2023-06-14 ENCOUNTER — MYC MEDICAL ADVICE (OUTPATIENT)
Dept: CARDIOLOGY | Facility: CLINIC | Age: 63
End: 2023-06-14
Payer: COMMERCIAL

## 2023-06-14 DIAGNOSIS — I10 BENIGN ESSENTIAL HYPERTENSION: Primary | ICD-10-CM

## 2023-06-14 NOTE — TELEPHONE ENCOUNTER
"From: Cain Worthy MD  Sent: 6/14/2023  12:42 PM CDT  To: Anabel Elvin  Subject: FW: BP is high again                             Yes  ----- Message -----  From: Anabel Oconnor  Sent: 6/14/2023  12:27 PM CDT  To: Cain Worthy MD  Subject: FW: BP is high again                             Yes, hold off on any changes?    ----- Message -----  From: Cain Worthy MD  Sent: 6/14/2023  12:23 PM CDT  To: Anabel Oconnor  Subject: FW: BP is high again                             Can we work him into the schedule next time I am in [place]?    ThanksCain  ----- Message -----  From: Anabel Oconnor  Sent: 6/14/2023  11:40 AM CDT  To: Cain Worthy MD  Subject: FW: BP is high again                             Hi Dr. Worthy,    Please see Pt's response. Alt antihypertensive?    ----- Message -----  From: Pastor Zhao \"Pat\"  Sent: 6/14/2023  11:29 AM CDT  To: Anabel Oconnor  Subject: BP is high again                                 Anabel,    I want off Lisinopril due to cough. Another med would possibly be better. Can we schedule visit and panel in [place]?              "

## 2023-06-14 NOTE — TELEPHONE ENCOUNTER
From: Cain Worthy MD  Sent: 6/14/2023   7:19 AM CDT  To: Anabel Oconnor; Sangeeta Ingram  Subject: FW: BP is high again                             Anabel,    Could he get a complete metabolic panel drawn and also start lisinopril 5 mg each day.  Please warn him about possible cough.  He will need to get a basic metabolic panel about 10 days after starting the lisinopril.  He should keep track of his blood pressures checking it 2 or 3 times a week.  Could I see him in follow-up in 2 to 3 weeks?    Thanks,    Cain

## 2023-06-14 NOTE — TELEPHONE ENCOUNTER
lvmm for pt regarding recommendations from Dr. Worthy . randal response sent-bmp ordered. Will await pt's response-annabella

## 2023-06-14 NOTE — TELEPHONE ENCOUNTER
"Addendum- Pt called in again and stated needs a crown for tooth and dentist will not perform until BP is controlled. To notify provider of additional information.    Pt called in, attempted to schedule for next available with Dr. Worthy. Pt expressed that he is leaving on Friday and Dr. Worthy will \"make time in the schedule for him, to work it out with provider and call him asap\"  Before writer could respond call disconnected. Will discuss with Dr. Worthy-ISA  " Cardiology Infectious Disease Infectious Disease Infectious Disease Infectious Disease Infectious Disease Internal Medicine Internal Medicine Internal Medicine Internal Medicine Internal Medicine Internal Medicine Internal Medicine Internal Medicine Internal Medicine Internal Medicine Internal Medicine Internal Medicine Internal Medicine Internal Medicine Internal Medicine Internal Medicine Nephrology Nephrology Nephrology Nephrology Nephrology Nephrology Palliative Care Palliative Care Internal Medicine Nephrology

## 2023-06-15 ENCOUNTER — OFFICE VISIT (OUTPATIENT)
Dept: CARDIOLOGY | Facility: CLINIC | Age: 63
End: 2023-06-15
Payer: COMMERCIAL

## 2023-06-15 VITALS
OXYGEN SATURATION: 97 % | SYSTOLIC BLOOD PRESSURE: 140 MMHG | HEART RATE: 75 BPM | WEIGHT: 244 LBS | RESPIRATION RATE: 16 BRPM | BODY MASS INDEX: 32.19 KG/M2 | DIASTOLIC BLOOD PRESSURE: 90 MMHG

## 2023-06-15 DIAGNOSIS — E78.49 OTHER HYPERLIPIDEMIA: ICD-10-CM

## 2023-06-15 DIAGNOSIS — N52.01 ERECTILE DYSFUNCTION DUE TO ARTERIAL INSUFFICIENCY: ICD-10-CM

## 2023-06-15 DIAGNOSIS — I10 BENIGN ESSENTIAL HYPERTENSION: ICD-10-CM

## 2023-06-15 DIAGNOSIS — E78.5 HYPERLIPIDEMIA: ICD-10-CM

## 2023-06-15 DIAGNOSIS — E78.5 HYPERLIPIDEMIA: Primary | ICD-10-CM

## 2023-06-15 LAB
ALBUMIN SERPL BCG-MCNC: 4.5 G/DL (ref 3.5–5.2)
ALP SERPL-CCNC: 68 U/L (ref 40–129)
ALT SERPL W P-5'-P-CCNC: 50 U/L (ref 0–70)
ANION GAP SERPL CALCULATED.3IONS-SCNC: 11 MMOL/L (ref 7–15)
AST SERPL W P-5'-P-CCNC: 32 U/L (ref 0–45)
BILIRUB SERPL-MCNC: 0.5 MG/DL
BUN SERPL-MCNC: 18.8 MG/DL (ref 8–23)
CALCIUM SERPL-MCNC: 9.2 MG/DL (ref 8.8–10.2)
CHLORIDE SERPL-SCNC: 103 MMOL/L (ref 98–107)
CHOLEST SERPL-MCNC: 136 MG/DL
CREAT SERPL-MCNC: 0.87 MG/DL (ref 0.67–1.17)
DEPRECATED HCO3 PLAS-SCNC: 25 MMOL/L (ref 22–29)
ERYTHROCYTE [DISTWIDTH] IN BLOOD BY AUTOMATED COUNT: 13.7 % (ref 10–15)
GFR SERPL CREATININE-BSD FRML MDRD: >90 ML/MIN/1.73M2
GLUCOSE SERPL-MCNC: 113 MG/DL (ref 70–99)
HBA1C MFR BLD: 5.8 %
HCT VFR BLD AUTO: 43.2 % (ref 40–53)
HDLC SERPL-MCNC: 35 MG/DL
HGB BLD-MCNC: 15 G/DL (ref 13.3–17.7)
LDLC SERPL CALC-MCNC: 82 MG/DL
MCH RBC QN AUTO: 30.2 PG (ref 26.5–33)
MCHC RBC AUTO-ENTMCNC: 34.7 G/DL (ref 31.5–36.5)
MCV RBC AUTO: 87 FL (ref 78–100)
NONHDLC SERPL-MCNC: 101 MG/DL
PLATELET # BLD AUTO: 178 10E3/UL (ref 150–450)
POTASSIUM SERPL-SCNC: 4.1 MMOL/L (ref 3.4–5.3)
PROT SERPL-MCNC: 7.1 G/DL (ref 6.4–8.3)
RBC # BLD AUTO: 4.97 10E6/UL (ref 4.4–5.9)
SODIUM SERPL-SCNC: 139 MMOL/L (ref 136–145)
TRIGL SERPL-MCNC: 93 MG/DL
WBC # BLD AUTO: 8.8 10E3/UL (ref 4–11)

## 2023-06-15 PROCEDURE — 80053 COMPREHEN METABOLIC PANEL: CPT | Performed by: INTERNAL MEDICINE

## 2023-06-15 PROCEDURE — 85027 COMPLETE CBC AUTOMATED: CPT | Performed by: INTERNAL MEDICINE

## 2023-06-15 PROCEDURE — 99214 OFFICE O/P EST MOD 30 MIN: CPT | Performed by: INTERNAL MEDICINE

## 2023-06-15 PROCEDURE — 36415 COLL VENOUS BLD VENIPUNCTURE: CPT | Performed by: INTERNAL MEDICINE

## 2023-06-15 PROCEDURE — 83036 HEMOGLOBIN GLYCOSYLATED A1C: CPT | Performed by: INTERNAL MEDICINE

## 2023-06-15 PROCEDURE — 80061 LIPID PANEL: CPT | Performed by: INTERNAL MEDICINE

## 2023-06-15 RX ORDER — AMLODIPINE BESYLATE 10 MG/1
10 TABLET ORAL DAILY
Qty: 90 TABLET | Refills: 3 | Status: SHIPPED | OUTPATIENT
Start: 2023-06-15

## 2023-06-15 RX ORDER — TADALAFIL 10 MG/1
10 TABLET ORAL EVERY 24 HOURS
Qty: 90 TABLET | Refills: 3 | Status: SHIPPED | OUTPATIENT
Start: 2023-06-15 | End: 2024-06-21

## 2023-06-15 RX ORDER — LOSARTAN POTASSIUM 25 MG/1
25 TABLET ORAL DAILY
Qty: 90 TABLET | Refills: 3 | Status: SHIPPED | OUTPATIENT
Start: 2023-06-15 | End: 2024-03-11

## 2023-06-15 RX ORDER — ATORVASTATIN CALCIUM 40 MG/1
40 TABLET, FILM COATED ORAL AT BEDTIME
Qty: 90 TABLET | Refills: 3 | Status: SHIPPED | OUTPATIENT
Start: 2023-06-15

## 2023-06-15 NOTE — PROGRESS NOTES
[facility]  [phone]          Assessment/Recommendations   Patient with known hypertension, hyperlipidemia, and family history of coronary artery disease.  Patient's blood pressure has been a bit elevated at least on the last couple of checks and is not at goal today.    We will add losartan 25 mg each day to his medical regimen.  He will keep track of his blood pressures at home and take it on a daily basis for the next couple of weeks and keep us posted.  We will check a complete metabolic panel, CBC, and lipid panel as he is fasting today.    We will renew his amlodipine, atorvastatin, and Cialis.    We will encourage more physical activity after his blood pressure is better controlled and repeat a basic metabolic panel in about 10 days with the addition of the H2 receptor blocker.  He did have a cough with lisinopril but I suspect this will not be the case with an H2 receptor blocker.    Thank you for allowing us to participate in his care.    30 minutes spent with chart review, patient visit, documentation and .       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  Patient has been feeling well.  He maintains an active lifestyle, moving around and walking a lot but not intentionally exercising in an aerobic fashion.  He denies any chest discomfort, shortness of breath, orthopnea, paroxysmal nocturnal dyspnea, peripheral edema or palpitations.    He was at the dentist recently and his blood pressure was 200 systolic and they would not work on his crown and asked him to follow-up with his physician.         Physical Examination Review of Systems   BP (!) 140/90 (BP Location: Right arm, Patient Position: Sitting, Cuff Size: Adult Large)   Pulse 75   Resp 16   Wt 110.7 kg (244 lb)   SpO2 97%   BMI 32.19 kg/m    Body mass index is 32.19 kg/m .  Wt  Readings from Last 3 Encounters:   06/15/23 110.7 kg (244 lb)   09/29/22 107.6 kg (237 lb 4.8 oz)   08/27/21 105.2 kg (232 lb)     General Appearance:   Alert, cooperative and in no acute distress.   ENT/Mouth: Pink/moist oral mucosa   EYES:  no scleral icterus, normal conjunctivae   Neck: JVP normal. No Hepatojugular reflux. Thyroid not visualized.   Chest/Lungs:   Lungs are clear to auscultation, equal chest wall expansion.   Cardiovascular:   S1, S2 without murmur ,clicks or rubs. Brachial, radial  pulses are intact and symetric. No carotid bruits noted   Abdomen:  Nontender. BS+. No bruits.   Extremities: No cyanosis, clubbing or edema   Skin: no xanthelasma, warm.    Neurologic: normal arm movement bilateral, no tremors     Psychiatric: Appropriate affect.      Enc Vitals  BP: (!) 140/90  Pulse: 75  Resp: 16  SpO2: 97 %  Weight: 110.7 kg (244 lb)                                           Medical History  Surgical History Family History Social History   Past Medical History:   Diagnosis Date     Hypertension     Past Surgical History:   Procedure Laterality Date     ORCHIECTOMY SCROTAL      Family History   Problem Relation Age of Onset     CABG Father     Social History     Socioeconomic History     Marital status:      Spouse name: Not on file     Number of children: Not on file     Years of education: Not on file     Highest education level: Not on file   Occupational History     Not on file   Tobacco Use     Smoking status: Never     Passive exposure: Yes     Smokeless tobacco: Never     Tobacco comments:     no second hand smoke   Vaping Use     Vaping status: Not on file   Substance and Sexual Activity     Alcohol use: Yes     Comment: occ.     Drug use: Not on file     Sexual activity: Not on file   Other Topics Concern     Not on file   Social History Narrative    ** Merged History Encounter **          Social Determinants of Health     Financial Resource Strain: Not on file   Food Insecurity: Not  on file   Transportation Needs: Not on file   Physical Activity: Not on file   Stress: Not on file   Social Connections: Not on file   Intimate Partner Violence: Not on file   Housing Stability: Not on file          Medications  Allergies   Current Outpatient Medications   Medication Sig Dispense Refill     albuterol (PROAIR HFA/PROVENTIL HFA/VENTOLIN HFA) 108 (90 Base) MCG/ACT inhaler Inhale 2 puffs into the lungs every 4 hours as needed       amLODIPine (NORVASC) 10 MG tablet Take 1 tablet (10 mg) by mouth daily 90 tablet 3     atorvastatin (LIPITOR) 40 MG tablet Take 1 tablet (40 mg) by mouth At Bedtime 90 tablet 3     finasteride (PROSCAR) 5 MG tablet Take 1 tablet (5 mg) by mouth daily 90 each 3     fluticasone (FLONASE) 50 MCG/ACT nasal spray as needed        losartan (COZAAR) 25 MG tablet Take 1 tablet (25 mg) by mouth daily 90 tablet 3     tadalafil (CIALIS) 10 MG tablet Take 1 tablet (10 mg) by mouth every 24 hours 90 tablet 3     TYLENOL ALLERGY SINUS 2-25- MG OR TABS Take by mouth as needed       zolpidem (AMBIEN) 5 MG tablet Take 5 mg by mouth nightly as needed       benzonatate (TESSALON) 200 MG capsule Take 1 capsule (200 mg) by mouth 3 times daily as needed for cough (Patient not taking: Reported on 6/15/2023) 30 capsule 0    Allergies   Allergen Reactions     Dust Mites          Lab Results    Chemistry/lipid CBC Cardiac Enzymes/BNP/TSH/INR   Lab Results   Component Value Date    CHOL 160 09/30/2022    HDL 28 (L) 09/30/2022    TRIG 100 09/30/2022    BUN 21.2 09/30/2022     09/30/2022    CO2 24 09/30/2022    Lab Results   Component Value Date    WBC 8.8 09/30/2022    HGB 14.7 09/30/2022    HCT 43.9 09/30/2022    MCV 88 09/30/2022     09/30/2022    No results found for: CKTOTAL, CKMB, TROPONINI, BNP, TSH, INR

## 2023-06-15 NOTE — LETTER
6/15/2023    ERICK CORTEZ MD   [address] [place] [address]      RE: Pastor Churchill       Dear Colleague,     I had the pleasure of seeing Pastor Churchill in the [facility].      [facility]  [phone]          Assessment/Recommendations   Patient with known hypertension, hyperlipidemia, and family history of coronary artery disease.  Patient's blood pressure has been a bit elevated at least on the last couple of checks and is not at goal today.    We will add losartan 25 mg each day to his medical regimen.  He will keep track of his blood pressures at home and take it on a daily basis for the next couple of weeks and keep us posted.  We will check a complete metabolic panel, CBC, and lipid panel as he is fasting today.    We will renew his amlodipine, atorvastatin, and Cialis.    We will encourage more physical activity after his blood pressure is better controlled and repeat a basic metabolic panel in about 10 days with the addition of the H2 receptor blocker.  He did have a cough with lisinopril but I suspect this will not be the case with an H2 receptor blocker.    Thank you for allowing us to participate in his care.    30 minutes spent with chart review, patient visit, documentation and .       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  Patient has been feeling well.  He maintains an active lifestyle, moving around and walking a lot but not intentionally exercising in an aerobic fashion.  He denies any chest discomfort, shortness of breath, orthopnea, paroxysmal nocturnal dyspnea, peripheral edema or palpitations.    He was at the dentist recently and his blood pressure was 200 systolic and they would not work on his crown and asked him to follow-up with his physician.         Physical Examination Review of Systems    BP (!) 140/90 (BP Location: Right arm, Patient Position: Sitting, Cuff Size: Adult Large)   Pulse 75   Resp 16   Wt 110.7 kg (244 lb)   SpO2 97%   BMI 32.19 kg/m    Body mass index is 32.19 kg/m .  Wt Readings from Last 3 Encounters:   06/15/23 110.7 kg (244 lb)   09/29/22 107.6 kg (237 lb 4.8 oz)   08/27/21 105.2 kg (232 lb)     General Appearance:   Alert, cooperative and in no acute distress.   ENT/Mouth: Pink/moist oral mucosa   EYES:  no scleral icterus, normal conjunctivae   Neck: JVP normal. No Hepatojugular reflux. Thyroid not visualized.   Chest/Lungs:   Lungs are clear to auscultation, equal chest wall expansion.   Cardiovascular:   S1, S2 without murmur ,clicks or rubs. Brachial, radial  pulses are intact and symetric. No carotid bruits noted   Abdomen:  Nontender. BS+. No bruits.   Extremities: No cyanosis, clubbing or edema   Skin: no xanthelasma, warm.    Neurologic: normal arm movement bilateral, no tremors     Psychiatric: Appropriate affect.      Enc Vitals  BP: (!) 140/90  Pulse: 75  Resp: 16  SpO2: 97 %  Weight: 110.7 kg (244 lb)                                           Medical History  Surgical History Family History Social History   Past Medical History:   Diagnosis Date    Hypertension     Past Surgical History:   Procedure Laterality Date    ORCHIECTOMY SCROTAL      Family History   Problem Relation Age of Onset    CABG Father     Social History     Socioeconomic History    Marital status:      Spouse name: Not on file    Number of children: Not on file    Years of education: Not on file    Highest education level: Not on file   Occupational History    Not on file   Tobacco Use    Smoking status: Never     Passive exposure: Yes    Smokeless tobacco: Never    Tobacco comments:     no second hand smoke   Vaping Use    Vaping status: Not on file   Substance and Sexual Activity    Alcohol use: Yes     Comment: occ.    Drug use: Not on file    Sexual activity: Not on file   Other  Topics Concern    Not on file   Social History Narrative    ** Merged History Encounter **          Social Determinants of Health     Financial Resource Strain: Not on file   Food Insecurity: Not on file   Transportation Needs: Not on file   Physical Activity: Not on file   Stress: Not on file   Social Connections: Not on file   Intimate Partner Violence: Not on file   Housing Stability: Not on file          Medications  Allergies   Current Outpatient Medications   Medication Sig Dispense Refill    albuterol (PROAIR HFA/PROVENTIL HFA/VENTOLIN HFA) 108 (90 Base) MCG/ACT inhaler Inhale 2 puffs into the lungs every 4 hours as needed      amLODIPine (NORVASC) 10 MG tablet Take 1 tablet (10 mg) by mouth daily 90 tablet 3    atorvastatin (LIPITOR) 40 MG tablet Take 1 tablet (40 mg) by mouth At Bedtime 90 tablet 3    finasteride (PROSCAR) 5 MG tablet Take 1 tablet (5 mg) by mouth daily 90 each 3    fluticasone (FLONASE) 50 MCG/ACT nasal spray as needed       losartan (COZAAR) 25 MG tablet Take 1 tablet (25 mg) by mouth daily 90 tablet 3    tadalafil (CIALIS) 10 MG tablet Take 1 tablet (10 mg) by mouth every 24 hours 90 tablet 3    TYLENOL ALLERGY SINUS 2-25- MG OR TABS Take by mouth as needed      zolpidem (AMBIEN) 5 MG tablet Take 5 mg by mouth nightly as needed      benzonatate (TESSALON) 200 MG capsule Take 1 capsule (200 mg) by mouth 3 times daily as needed for cough (Patient not taking: Reported on 6/15/2023) 30 capsule 0    Allergies   Allergen Reactions    Dust Mites          Lab Results    Chemistry/lipid CBC Cardiac Enzymes/BNP/TSH/INR   Lab Results   Component Value Date    CHOL 160 09/30/2022    HDL 28 (L) 09/30/2022    TRIG 100 09/30/2022    BUN 21.2 09/30/2022     09/30/2022    CO2 24 09/30/2022    Lab Results   Component Value Date    WBC 8.8 09/30/2022    HGB 14.7 09/30/2022    HCT 43.9 09/30/2022    MCV 88 09/30/2022     09/30/2022    No results found for: CKTOTAL, CKMB, TROPONINI, BNP,  TSH, INR             Thank you for allowing me to participate in the care of your patient.      Sincerely,   Cain Worthy MD     [facility]  cc:   Cain Worthy MD  [address] [address]

## 2023-08-28 ENCOUNTER — E-VISIT (OUTPATIENT)
Dept: URGENT CARE | Facility: CLINIC | Age: 63
End: 2023-08-28
Payer: COMMERCIAL

## 2023-08-28 DIAGNOSIS — R05.1 ACUTE COUGH: Primary | ICD-10-CM

## 2023-08-28 PROCEDURE — 99207 PR NON-BILLABLE SERV PER CHARTING: CPT | Performed by: FAMILY MEDICINE

## 2023-08-28 NOTE — PATIENT INSTRUCTIONS
Dear Pastor Churchill,    We are sorry you are not feeling well. Based on the responses you provided, it is recommended that you be seen in-person in urgent care so we can better evaluate your symptoms. Please click here to find the nearest urgent care location to you.   You will not be charged for this Visit. Thank you for trusting us with your care.    JUSTINO ORO CNP    
[phone]

## 2024-03-11 DIAGNOSIS — I10 BENIGN ESSENTIAL HYPERTENSION: ICD-10-CM

## 2024-03-11 DIAGNOSIS — N52.01 ERECTILE DYSFUNCTION DUE TO ARTERIAL INSUFFICIENCY: ICD-10-CM

## 2024-03-11 DIAGNOSIS — E78.49 OTHER HYPERLIPIDEMIA: ICD-10-CM

## 2024-03-11 RX ORDER — LOSARTAN POTASSIUM 25 MG/1
25 TABLET ORAL DAILY
Qty: 90 TABLET | Refills: 0 | Status: SHIPPED | OUTPATIENT
Start: 2024-03-11

## 2024-03-11 NOTE — TELEPHONE ENCOUNTER
Incoming fax request to refill Losartan 25 mg tab Rx. Due for follow-up Spring summer upon return to [place]. Refill sent. SONNYRn

## 2024-06-20 DIAGNOSIS — I10 BENIGN ESSENTIAL HYPERTENSION: ICD-10-CM

## 2024-06-21 RX ORDER — TADALAFIL 10 MG/1
10 TABLET ORAL DAILY
Qty: 90 TABLET | Refills: 0 | Status: SHIPPED | OUTPATIENT
Start: 2024-06-21

## 2024-06-22 ENCOUNTER — HEALTH MAINTENANCE LETTER (OUTPATIENT)
Age: 64
End: 2024-06-22

## 2025-01-04 ENCOUNTER — HEALTH MAINTENANCE LETTER (OUTPATIENT)
Age: 65
End: 2025-01-04

## 2025-03-24 ENCOUNTER — MYC REFILL (OUTPATIENT)
Dept: CARDIOLOGY | Facility: CLINIC | Age: 65
End: 2025-03-24
Payer: COMMERCIAL

## 2025-03-24 DIAGNOSIS — E78.49 OTHER HYPERLIPIDEMIA: ICD-10-CM

## 2025-03-24 DIAGNOSIS — I10 BENIGN ESSENTIAL HYPERTENSION: Primary | ICD-10-CM

## 2025-03-24 DIAGNOSIS — N52.01 ERECTILE DYSFUNCTION DUE TO ARTERIAL INSUFFICIENCY: ICD-10-CM

## 2025-03-24 RX ORDER — AMLODIPINE BESYLATE 10 MG/1
10 TABLET ORAL DAILY
Qty: 90 TABLET | Refills: 3 | OUTPATIENT
Start: 2025-03-24

## 2025-03-24 RX ORDER — TADALAFIL 10 MG/1
10 TABLET ORAL DAILY
Qty: 90 TABLET | Refills: 0 | OUTPATIENT
Start: 2025-03-24

## 2025-03-24 RX ORDER — LOSARTAN POTASSIUM 25 MG/1
25 TABLET ORAL DAILY
Qty: 90 TABLET | Refills: 0 | OUTPATIENT
Start: 2025-03-24

## 2025-03-24 RX ORDER — FINASTERIDE 5 MG/1
1 TABLET, FILM COATED ORAL DAILY
Qty: 90 TABLET | Refills: 3 | OUTPATIENT
Start: 2025-03-24

## 2025-03-24 RX ORDER — ATORVASTATIN CALCIUM 40 MG/1
40 TABLET, FILM COATED ORAL AT BEDTIME
Qty: 90 TABLET | Refills: 3 | OUTPATIENT
Start: 2025-03-24

## 2025-03-28 NOTE — TELEPHONE ENCOUNTER
Health Call Center    Phone Message    May a detailed message be left on voicemail: yes     Reason for Call: Medication Refill Request    Has the patient contacted the pharmacy for the refill? Yes   Name of medication being requested: Please see list below  Provider who prescribed the medication: Dr Worthy   Pharmacy:    [id number] - [place], [place] - [address]   Date medication is needed: 3/28   The patient was asked to schedule an appointment with a provider and he said he only wanted to see Dr Worthy who has no availability at any location when the patient returns in Late June or early July   The patient said he is flying out for [place] tomorrow and needs the refill today   Patient asked for a call if these could not be refilled     Action Taken: Other: Cardiology    Travel Screening: Not Applicable    Thank you!  [facility]       Date of Service:

## 2025-03-31 NOTE — TELEPHONE ENCOUNTER
"Spoke with patient, he explained he did set up follow-up with JERICA 7/3/25. Pt reports wintering in [place] and now in [place].    Explained to patient: most recently seen by BRANDEN in 6/23 and was to follow-up in 12/23 and that Heart Team required patients to follow-up at recommended intervals in order to safely refill prescriptions.     Pt reported frustration, stating \"well what happens if I have a stroke because you didn't refill my prescriptions?\".  Explained that we require patients to be following up and are held accountable.    Noted per chart review that pt would not have had enough of the medications as prescribed by JERICA to take him through until now. Pt stated he has been going to urgent care for refills.    Encouraged pt to reach out to PCP for refills for medications.   Pt explained he does not have  primary care. Encouraged pt to establish primary care in [place].      Pt requested  to refill prescriptions for :        finasteride (PROSCAR) 5 MG tablet daily         amLODIPine (NORVASC) 10 MG tablet daily         atorvastatin (LIPITOR) 40 MG tablet at bedtime         losartan (COZAAR) 25 MG tablet daily         tadalafil (CIALIS) 10 MG tablet daily      and send to [facility] pharmacy  in [place], [place].-njm  "

## 2025-03-31 NOTE — TELEPHONE ENCOUNTER
Called patient-- unable to reach. Left detailed message explaining overdue for follow-up with [facility]- not able to refill requested medications, encouraged pt to reach out to PCP, note to schedule follow-up for July once schedule available for [facility] and for patient to return call with questions. -mandeep

## 2025-04-01 RX ORDER — TADALAFIL 10 MG/1
10 TABLET ORAL DAILY
Qty: 90 TABLET | Refills: 1 | Status: SHIPPED | OUTPATIENT
Start: 2025-04-01

## 2025-04-01 RX ORDER — ATORVASTATIN CALCIUM 40 MG/1
40 TABLET, FILM COATED ORAL AT BEDTIME
Qty: 90 TABLET | Refills: 1 | Status: SHIPPED | OUTPATIENT
Start: 2025-04-01

## 2025-04-01 RX ORDER — AMLODIPINE BESYLATE 10 MG/1
10 TABLET ORAL DAILY
Qty: 90 TABLET | Refills: 1 | Status: SHIPPED | OUTPATIENT
Start: 2025-04-01

## 2025-04-01 RX ORDER — LOSARTAN POTASSIUM 25 MG/1
25 TABLET ORAL DAILY
Qty: 90 TABLET | Refills: 1 | Status: SHIPPED | OUTPATIENT
Start: 2025-04-01

## 2025-04-01 RX ORDER — FINASTERIDE 5 MG/1
1 TABLET, FILM COATED ORAL DAILY
Qty: 90 TABLET | Refills: 1 | Status: SHIPPED | OUTPATIENT
Start: 2025-04-01

## 2025-04-01 NOTE — TELEPHONE ENCOUNTER
Spoke with patient and updated him of 's notes and recommendations. Pt verbalized understanding, pt thanks team. Pt requested MyChart be sent with 's recommendation.   MobileReactor[employer] message sent. Will await CMP result.-mandeep

## 2025-04-01 NOTE — TELEPHONE ENCOUNTER
Refills sent. CMP ordered. Called patient-- unable to reach. Left message for patient to return call to discuss recommendations. -mandeep    _________________  ----- Message -----  From: Cain Worthy MD  Sent: 3/31/2025   5:30 PM CDT  To: Claudia Bai, RN    Giselle,    Okay to refill meds as requested and could we ask him to get a complete metabolic panel in [place] and send us the results.    ThanksCain  ----- Message -----  From: Claudia Bai, DALILA  Sent: 3/31/2025   1:56 PM CDT  To: Cain Worthy MD    ----- Message from Claudia Bai RN sent at 3/31/2025  1:56 PM CDT -----  Please see notes and advise.  Thank you.  -mandeep

## 2025-07-03 ENCOUNTER — OFFICE VISIT (OUTPATIENT)
Dept: CARDIOLOGY | Facility: CLINIC | Age: 65
End: 2025-07-03
Payer: COMMERCIAL

## 2025-07-03 VITALS
DIASTOLIC BLOOD PRESSURE: 80 MMHG | HEIGHT: 73 IN | TEMPERATURE: 97.9 F | HEART RATE: 81 BPM | RESPIRATION RATE: 16 BRPM | OXYGEN SATURATION: 95 % | SYSTOLIC BLOOD PRESSURE: 143 MMHG | BODY MASS INDEX: 33.17 KG/M2 | WEIGHT: 250.3 LBS

## 2025-07-03 DIAGNOSIS — I25.10 CORONARY ARTERY DISEASE INVOLVING NATIVE CORONARY ARTERY OF NATIVE HEART WITHOUT ANGINA PECTORIS: ICD-10-CM

## 2025-07-03 DIAGNOSIS — I10 BENIGN ESSENTIAL HYPERTENSION: Primary | ICD-10-CM

## 2025-07-03 RX ORDER — KETOCONAZOLE 20 MG/G
1 CREAM TOPICAL
COMMUNITY
Start: 2024-02-28

## 2025-07-03 RX ORDER — EZETIMIBE 10 MG/1
10 TABLET ORAL DAILY
Qty: 90 TABLET | Refills: 3 | Status: SHIPPED | OUTPATIENT
Start: 2025-07-03

## 2025-07-03 RX ORDER — HYDROCORTISONE 25 MG/G
1 CREAM TOPICAL
COMMUNITY
Start: 2024-02-28

## 2025-07-03 NOTE — PROGRESS NOTES
[facility]  [phone]          Assessment/Recommendations   Patient with known elevated coronary calcium score, hyperlipidemia and hypertension.  LDL cholesterol is in the 70s but the goal for his LDL cholesterol should be well under 70 or even in the 50s.  He has also had elevation of his LP(a) at approximately 120.    I recommended that we add ezetimibe 10 mg a day to his current rosuvastatin 40 mg a day and recheck his lipid panel in 2 to 3 months.  If his LDL cholesterol is not well below 70, I would have a low threshold to switch him to a PCSK9 inhibitor and discontinue the ezetimibe and potentially even back off a bit on the statin.  We would then follow his lipids as well.    Blood pressure is not at goal today.  He had his losartan was increased mildly from 25-50 but he does report that he is under undue stress at this time.  He is not doing aerobic exercise on a regular basis but does exercise that is productive such as mowing the lawn, landscaping.  I do think he would benefit from regular exercise and have strongly encouraged him to do 30 minutes of brisk walking or like exercising at least 5 times each week.  I have asked him to take his blood pressure and record it about 3 times a week for the next month and then send us the readings.  Would have a low threshold to go up on his losartan.    Will set him up for a follow-up visit in 1 year.    It has been my honor to care for Rupali Churchill.    The longitudinal plan of care for the diagnosis(es)/condition(s) as documented were addressed during this visit. Due to the added complexity in care, I will continue to support Rupali in the subsequent management and with ongoing continuity of care.        History of Present Illness/Subjective    Mr. Pastor Churchill is a 64 year old male with known elevated calcium score and hypertension.  He has been feeling well.  He denies any shortness of breath, chest pain, orthopnea, paroxysmal nocturnal  "dyspnea, peripheral edema, syncope or near syncopal episodes.  He does mow his lawn which is large and uses a push mower and has been doing some landscaping without symptoms.  He does not go to the gym or do regular exercise in an aerobic fashion.     Physical Examination Review of Systems   BP (!) 143/80 (BP Location: Left arm, Patient Position: Sitting, Cuff Size: Adult Large)   Pulse 81   Temp 97.9  F (36.6  C) (Oral)   Resp 16   Ht 1.854 m (6' 1\")   Wt 113.5 kg (250 lb 4.8 oz)   SpO2 95%   BMI 33.02 kg/m    Body mass index is 33.02 kg/m .  Wt Readings from Last 3 Encounters:   07/03/25 113.5 kg (250 lb 4.8 oz)   06/15/23 110.7 kg (244 lb)   09/29/22 107.6 kg (237 lb 4.8 oz)     General Appearance:   Alert, cooperative and in no acute distress.   ENT/Mouth: Pink/moist oral mucosa   EYES:  no scleral icterus, normal conjunctivae   Neck: JVP normal. No Hepatojugular reflux. Thyroid not visualized.   Chest/Lungs:   Lungs are clear to auscultation, equal chest wall expansion.   Cardiovascular:   S1, S2 with 1/6 systolic murmur , no clicks or rubs. Brachial, radial and posterior tibial pulses are intact and symetric. No carotid bruits noted   Abdomen:  Nontender.    Extremities: No cyanosis, clubbing or edema   Skin: no xanthelasma, warm.    Neurologic: normal arm movement bilateral, no tremors     Psychiatric: Appropriate affect.      Encounter Vitals  BP: (!) 143/80  Pulse: 81  Resp: 16  Temp: 97.9  F (36.6  C)  Temp src: Oral  SpO2: 95 %  Weight: 113.5 kg (250 lb 4.8 oz)  Height: 185.4 cm (6' 1\")                                           Medical History  Surgical History Family History Social History   Past Medical History:   Diagnosis Date    Hypertension     Past Surgical History:   Procedure Laterality Date    ORCHIECTOMY SCROTAL      Family History   Problem Relation Age of Onset    CABG Father     Social History     Socioeconomic History    Marital status:      Spouse name: Not on file    Number " of children: Not on file    Years of education: Not on file    Highest education level: Not on file   Occupational History    Not on file   Tobacco Use    Smoking status: Never     Passive exposure: Yes    Smokeless tobacco: Never    Tobacco comments:     no second hand smoke   Vaping Use    Vaping status: Never Used   Substance and Sexual Activity    Alcohol use: Yes     Comment: occ.    Drug use: Not on file    Sexual activity: Not on file   Other Topics Concern    Not on file   Social History Narrative    ** Merged History Encounter **          Social Drivers of Health     Financial Resource Strain: Not on file   Food Insecurity: No Food Insecurity (3/13/2025)    Received from [facility]    Hunger Vital Sign     Worried About Running Out of Food in the Last Year: Never true     Ran Out of Food in the Last Year: Never true   Transportation Needs: No Transportation Needs (3/13/2025)    Received from [facility]    PRAPARE - Transportation     Lack of Transportation (Medical): No     Lack of Transportation (Non-Medical): No   Physical Activity: Not on file   Stress: Not on file   Social Connections: Not on file   Interpersonal Safety: Not on file   Housing Stability: Low Risk  (3/13/2025)    Received from [facility]    Housing Stability     What is your living situation today?: I have a steady place to live          Medications  Allergies   Current Outpatient Medications   Medication Sig Dispense Refill    albuterol (PROAIR HFA/PROVENTIL HFA/VENTOLIN HFA) 108 (90 Base) MCG/ACT inhaler Inhale 2 puffs into the lungs every 4 hours as needed      amLODIPine (NORVASC) 10 MG tablet Take 1 tablet (10 mg) by mouth daily. 90 tablet 1    finasteride (PROSCAR) 5 MG tablet Take 1 tablet (5 mg) by mouth daily. 90 tablet 1    fluticasone (FLONASE) 50 MCG/ACT nasal spray as needed       hydrocortisone 2.5 % cream Apply 1 Application topically.      ketoconazole (NIZORAL) 2 % external cream Apply 1 Application topically.       "losartan (COZAAR) 25 MG tablet Take 1 tablet (25 mg) by mouth daily. 90 tablet 1    rosuvastatin (CRESTOR) 40 MG tablet Take 1 tablet (40 mg) by mouth daily. 90 tablet 1    tadalafil (CIALIS) 10 MG tablet Take 1 tablet (10 mg) by mouth daily. 90 tablet 1    TYLENOL ALLERGY SINUS 2-25- MG OR TABS Take by mouth as needed      zolpidem (AMBIEN) 5 MG tablet Take 5 mg by mouth nightly as needed      Allergies   Allergen Reactions    Dust Mites          Lab Results    Chemistry/lipid CBC Cardiac Enzymes/BNP/TSH/INR   Lab Results   Component Value Date    CHOL 136 06/15/2023    HDL 35 (L) 06/15/2023    TRIG 93 06/15/2023    BUN 18.8 06/15/2023     06/15/2023    CO2 25 06/15/2023    Lab Results   Component Value Date    WBC 8.8 06/15/2023    HGB 15.0 06/15/2023    HCT 43.2 06/15/2023    MCV 87 06/15/2023     06/15/2023    No results found for: \"CKTOTAL\", \"CKMB\", \"TROPONINI\", \"BNP\", \"TSH\", \"INR\"                                         "

## 2025-07-03 NOTE — LETTER
7/3/2025    Physician No Ref-Primary  No address on file    RE: Pastor Churchill       Dear Colleague,     I had the pleasure of seeing Pastor Churchill in the [facility].      [facility]  [phone]          Assessment/Recommendations   Patient with known elevated coronary calcium score, hyperlipidemia and hypertension.  LDL cholesterol is in the 70s but the goal for his LDL cholesterol should be well under 70 or even in the 50s.  He has also had elevation of his LP(a) at approximately 120.    I recommended that we add ezetimibe 10 mg a day to his current rosuvastatin 40 mg a day and recheck his lipid panel in 2 to 3 months.  If his LDL cholesterol is not well below 70, I would have a low threshold to switch him to a PCSK9 inhibitor and discontinue the ezetimibe and potentially even back off a bit on the statin.  We would then follow his lipids as well.    Blood pressure is not at goal today.  He had his losartan was increased mildly from 25-50 but he does report that he is under undue stress at this time.  He is not doing aerobic exercise on a regular basis but does exercise that is productive such as mowing the lawn, landscaping.  I do think he would benefit from regular exercise and have strongly encouraged him to do 30 minutes of brisk walking or like exercising at least 5 times each week.  I have asked him to take his blood pressure and record it about 3 times a week for the next month and then send us the readings.  Would have a low threshold to go up on his losartan.    Will set him up for a follow-up visit in 1 year.    It has been my honor to care for Rupali Churchill.    The longitudinal plan of care for the diagnosis(es)/condition(s) as documented were addressed during this visit. Due to the added complexity in care, I will continue to support Rupali in the subsequent management and with ongoing continuity of care.        History of Present Illness/Subjective    Mr. Kathleen  "Zhao is a 64 year old male with known elevated calcium score and hypertension.  He has been feeling well.  He denies any shortness of breath, chest pain, orthopnea, paroxysmal nocturnal dyspnea, peripheral edema, syncope or near syncopal episodes.  He does mow his lawn which is large and uses a push mower and has been doing some landscaping without symptoms.  He does not go to the gym or do regular exercise in an aerobic fashion.     Physical Examination Review of Systems   BP (!) 143/80 (BP Location: Left arm, Patient Position: Sitting, Cuff Size: Adult Large)   Pulse 81   Temp 97.9  F (36.6  C) (Oral)   Resp 16   Ht 1.854 m (6' 1\")   Wt 113.5 kg (250 lb 4.8 oz)   SpO2 95%   BMI 33.02 kg/m    Body mass index is 33.02 kg/m .  Wt Readings from Last 3 Encounters:   07/03/25 113.5 kg (250 lb 4.8 oz)   06/15/23 110.7 kg (244 lb)   09/29/22 107.6 kg (237 lb 4.8 oz)     General Appearance:   Alert, cooperative and in no acute distress.   ENT/Mouth: Pink/moist oral mucosa   EYES:  no scleral icterus, normal conjunctivae   Neck: JVP normal. No Hepatojugular reflux. Thyroid not visualized.   Chest/Lungs:   Lungs are clear to auscultation, equal chest wall expansion.   Cardiovascular:   S1, S2 with 1/6 systolic murmur , no clicks or rubs. Brachial, radial and posterior tibial pulses are intact and symetric. No carotid bruits noted   Abdomen:  Nontender.    Extremities: No cyanosis, clubbing or edema   Skin: no xanthelasma, warm.    Neurologic: normal arm movement bilateral, no tremors     Psychiatric: Appropriate affect.      Encounter Vitals  BP: (!) 143/80  Pulse: 81  Resp: 16  Temp: 97.9  F (36.6  C)  Temp src: Oral  SpO2: 95 %  Weight: 113.5 kg (250 lb 4.8 oz)  Height: 185.4 cm (6' 1\")                                           Medical History  Surgical History Family History Social History   Past Medical History:   Diagnosis Date     Hypertension     Past Surgical History:   Procedure Laterality Date     " ORCHIECTOMY SCROTAL      Family History   Problem Relation Age of Onset     CABG Father     Social History     Socioeconomic History     Marital status:      Spouse name: Not on file     Number of children: Not on file     Years of education: Not on file     Highest education level: Not on file   Occupational History     Not on file   Tobacco Use     Smoking status: Never     Passive exposure: Yes     Smokeless tobacco: Never     Tobacco comments:     no second hand smoke   Vaping Use     Vaping status: Never Used   Substance and Sexual Activity     Alcohol use: Yes     Comment: occ.     Drug use: Not on file     Sexual activity: Not on file   Other Topics Concern     Not on file   Social History Narrative    ** Merged History Encounter **          Social Drivers of Health     Financial Resource Strain: Not on file   Food Insecurity: No Food Insecurity (3/13/2025)    Received from [facility]    Hunger Vital Sign      Worried About Running Out of Food in the Last Year: Never true      Ran Out of Food in the Last Year: Never true   Transportation Needs: No Transportation Needs (3/13/2025)    Received from [facility]    PRAPARE - Transportation      Lack of Transportation (Medical): No      Lack of Transportation (Non-Medical): No   Physical Activity: Not on file   Stress: Not on file   Social Connections: Not on file   Interpersonal Safety: Not on file   Housing Stability: Low Risk  (3/13/2025)    Received from [facility]    Housing Stability      What is your living situation today?: I have a steady place to live          Medications  Allergies   Current Outpatient Medications   Medication Sig Dispense Refill     albuterol (PROAIR HFA/PROVENTIL HFA/VENTOLIN HFA) 108 (90 Base) MCG/ACT inhaler Inhale 2 puffs into the lungs every 4 hours as needed       amLODIPine (NORVASC) 10 MG tablet Take 1 tablet (10 mg) by mouth daily. 90 tablet 1     finasteride (PROSCAR) 5 MG tablet Take 1 tablet (5 mg) by mouth daily.  "90 tablet 1     fluticasone (FLONASE) 50 MCG/ACT nasal spray as needed        hydrocortisone 2.5 % cream Apply 1 Application topically.       ketoconazole (NIZORAL) 2 % external cream Apply 1 Application topically.       losartan (COZAAR) 25 MG tablet Take 1 tablet (25 mg) by mouth daily. 90 tablet 1     rosuvastatin (CRESTOR) 40 MG tablet Take 1 tablet (40 mg) by mouth daily. 90 tablet 1     tadalafil (CIALIS) 10 MG tablet Take 1 tablet (10 mg) by mouth daily. 90 tablet 1     TYLENOL ALLERGY SINUS 2-25- MG OR TABS Take by mouth as needed       zolpidem (AMBIEN) 5 MG tablet Take 5 mg by mouth nightly as needed      Allergies   Allergen Reactions     Dust Mites          Lab Results    Chemistry/lipid CBC Cardiac Enzymes/BNP/TSH/INR   Lab Results   Component Value Date    CHOL 136 06/15/2023    HDL 35 (L) 06/15/2023    TRIG 93 06/15/2023    BUN 18.8 06/15/2023     06/15/2023    CO2 25 06/15/2023    Lab Results   Component Value Date    WBC 8.8 06/15/2023    HGB 15.0 06/15/2023    HCT 43.2 06/15/2023    MCV 87 06/15/2023     06/15/2023    No results found for: \"CKTOTAL\", \"CKMB\", \"TROPONINI\", \"BNP\", \"TSH\", \"INR\"                                           Thank you for allowing me to participate in the care of your patient.      Sincerely,     Cain Worthy MD     [facility]  cc:   Cain Worthy MD  [address]  [place] [address]      "